# Patient Record
Sex: FEMALE | Race: WHITE | NOT HISPANIC OR LATINO | Employment: OTHER | URBAN - METROPOLITAN AREA
[De-identification: names, ages, dates, MRNs, and addresses within clinical notes are randomized per-mention and may not be internally consistent; named-entity substitution may affect disease eponyms.]

---

## 2018-06-09 ENCOUNTER — APPOINTMENT (EMERGENCY)
Dept: RADIOLOGY | Facility: HOSPITAL | Age: 58
End: 2018-06-09
Payer: COMMERCIAL

## 2018-06-09 ENCOUNTER — HOSPITAL ENCOUNTER (OUTPATIENT)
Facility: HOSPITAL | Age: 58
Setting detail: OBSERVATION
Discharge: HOME | End: 2018-06-11
Attending: EMERGENCY MEDICINE | Admitting: INTERNAL MEDICINE
Payer: COMMERCIAL

## 2018-06-09 DIAGNOSIS — K62.5 BRIGHT RED BLOOD PER RECTUM: ICD-10-CM

## 2018-06-09 DIAGNOSIS — R10.9 ABDOMINAL PAIN: Primary | ICD-10-CM

## 2018-06-09 DIAGNOSIS — K52.9 CHRONIC DIARRHEA: ICD-10-CM

## 2018-06-09 DIAGNOSIS — Z72.0 TOBACCO ABUSE: ICD-10-CM

## 2018-06-09 DIAGNOSIS — K92.2 GI BLEED: ICD-10-CM

## 2018-06-09 DIAGNOSIS — K76.9 LIVER LESION: ICD-10-CM

## 2018-06-09 LAB
ALBUMIN SERPL BCP-MCNC: 3.5 G/DL (ref 3.5–5)
ALP SERPL-CCNC: 112 U/L (ref 46–116)
ALT SERPL W P-5'-P-CCNC: 27 U/L (ref 12–78)
ANION GAP SERPL CALCULATED.3IONS-SCNC: 6 MMOL/L (ref 4–13)
AST SERPL W P-5'-P-CCNC: 12 U/L (ref 5–45)
BACTERIA UR QL AUTO: ABNORMAL /HPF
BASOPHILS # BLD AUTO: 0.04 THOUSANDS/ΜL (ref 0–0.1)
BASOPHILS NFR BLD AUTO: 1 % (ref 0–1)
BILIRUB SERPL-MCNC: 0.24 MG/DL (ref 0.2–1)
BILIRUB UR QL STRIP: NEGATIVE
BUN SERPL-MCNC: 9 MG/DL (ref 5–25)
CALCIUM SERPL-MCNC: 8.7 MG/DL (ref 8.3–10.1)
CHLORIDE SERPL-SCNC: 110 MMOL/L (ref 100–108)
CHOLEST SERPL-MCNC: 276 MG/DL (ref 50–200)
CLARITY UR: CLEAR
CO2 SERPL-SCNC: 24 MMOL/L (ref 21–32)
COLOR UR: YELLOW
COLOR, POC: NORMAL
CREAT SERPL-MCNC: 0.98 MG/DL (ref 0.6–1.3)
CRP SERPL QL: 6.6 MG/L
EOSINOPHIL # BLD AUTO: 0.25 THOUSAND/ΜL (ref 0–0.61)
EOSINOPHIL NFR BLD AUTO: 4 % (ref 0–6)
ERYTHROCYTE [DISTWIDTH] IN BLOOD BY AUTOMATED COUNT: 13.6 % (ref 11.6–15.1)
EST. AVERAGE GLUCOSE BLD GHB EST-MCNC: 120 MG/DL
GFR SERPL CREATININE-BSD FRML MDRD: 65 ML/MIN/1.73SQ M
GLUCOSE SERPL-MCNC: 98 MG/DL (ref 65–140)
GLUCOSE UR STRIP-MCNC: NEGATIVE MG/DL
HBA1C MFR BLD: 5.8 % (ref 4.2–6.3)
HCT VFR BLD AUTO: 39.2 % (ref 34.8–46.1)
HDLC SERPL-MCNC: 49 MG/DL (ref 40–60)
HGB BLD-MCNC: 13.2 G/DL (ref 11.5–15.4)
HGB UR QL STRIP.AUTO: ABNORMAL
HYALINE CASTS #/AREA URNS LPF: ABNORMAL /LPF
IGA SERPL-MCNC: 143 MG/DL (ref 70–400)
IMM GRANULOCYTES # BLD AUTO: 0.01 THOUSAND/UL (ref 0–0.2)
IMM GRANULOCYTES NFR BLD AUTO: 0 % (ref 0–2)
INR PPP: 1 (ref 0.86–1.17)
KETONES UR STRIP-MCNC: NEGATIVE MG/DL
LDLC SERPL CALC-MCNC: 202 MG/DL (ref 0–100)
LEUKOCYTE ESTERASE UR QL STRIP: NEGATIVE
LIPASE SERPL-CCNC: 83 U/L (ref 73–393)
LYMPHOCYTES # BLD AUTO: 3.04 THOUSANDS/ΜL (ref 0.6–4.47)
LYMPHOCYTES NFR BLD AUTO: 50 % (ref 14–44)
MCH RBC QN AUTO: 30.1 PG (ref 26.8–34.3)
MCHC RBC AUTO-ENTMCNC: 33.7 G/DL (ref 31.4–37.4)
MCV RBC AUTO: 90 FL (ref 82–98)
MONOCYTES # BLD AUTO: 0.63 THOUSAND/ΜL (ref 0.17–1.22)
MONOCYTES NFR BLD AUTO: 10 % (ref 4–12)
NEUTROPHILS # BLD AUTO: 2.11 THOUSANDS/ΜL (ref 1.85–7.62)
NEUTS SEG NFR BLD AUTO: 35 % (ref 43–75)
NITRITE UR QL STRIP: NEGATIVE
NON-SQ EPI CELLS URNS QL MICRO: ABNORMAL /HPF
NONHDLC SERPL-MCNC: 227 MG/DL
NRBC BLD AUTO-RTO: 0 /100 WBCS
PH UR STRIP.AUTO: 8.5 [PH] (ref 4.5–8)
PLATELET # BLD AUTO: 320 THOUSANDS/UL (ref 149–390)
PMV BLD AUTO: 8.9 FL (ref 8.9–12.7)
POTASSIUM SERPL-SCNC: 3.9 MMOL/L (ref 3.5–5.3)
PROT SERPL-MCNC: 7 G/DL (ref 6.4–8.2)
PROT UR STRIP-MCNC: ABNORMAL MG/DL
PROTHROMBIN TIME: 13.3 SECONDS (ref 11.8–14.2)
RBC # BLD AUTO: 4.38 MILLION/UL (ref 3.81–5.12)
RBC #/AREA URNS AUTO: ABNORMAL /HPF
SODIUM SERPL-SCNC: 140 MMOL/L (ref 136–145)
SP GR UR STRIP.AUTO: 1.01 (ref 1–1.03)
TRIGL SERPL-MCNC: 127 MG/DL
UROBILINOGEN UR QL STRIP.AUTO: 1 E.U./DL
WBC # BLD AUTO: 6.08 THOUSAND/UL (ref 4.31–10.16)
WBC #/AREA URNS AUTO: ABNORMAL /HPF

## 2018-06-09 PROCEDURE — 36415 COLL VENOUS BLD VENIPUNCTURE: CPT | Performed by: EMERGENCY MEDICINE

## 2018-06-09 PROCEDURE — 96374 THER/PROPH/DIAG INJ IV PUSH: CPT

## 2018-06-09 PROCEDURE — 96376 TX/PRO/DX INJ SAME DRUG ADON: CPT

## 2018-06-09 PROCEDURE — 99285 EMERGENCY DEPT VISIT HI MDM: CPT

## 2018-06-09 PROCEDURE — 74177 CT ABD & PELVIS W/CONTRAST: CPT

## 2018-06-09 PROCEDURE — 82784 ASSAY IGA/IGD/IGG/IGM EACH: CPT | Performed by: INTERNAL MEDICINE

## 2018-06-09 PROCEDURE — 86140 C-REACTIVE PROTEIN: CPT | Performed by: INTERNAL MEDICINE

## 2018-06-09 PROCEDURE — 96375 TX/PRO/DX INJ NEW DRUG ADDON: CPT

## 2018-06-09 PROCEDURE — 85610 PROTHROMBIN TIME: CPT | Performed by: INTERNAL MEDICINE

## 2018-06-09 PROCEDURE — 83690 ASSAY OF LIPASE: CPT | Performed by: EMERGENCY MEDICINE

## 2018-06-09 PROCEDURE — 85025 COMPLETE CBC W/AUTO DIFF WBC: CPT | Performed by: EMERGENCY MEDICINE

## 2018-06-09 PROCEDURE — 99220 PR INITIAL OBSERVATION CARE/DAY 70 MINUTES: CPT | Performed by: INTERNAL MEDICINE

## 2018-06-09 PROCEDURE — 80061 LIPID PANEL: CPT | Performed by: INTERNAL MEDICINE

## 2018-06-09 PROCEDURE — 81001 URINALYSIS AUTO W/SCOPE: CPT

## 2018-06-09 PROCEDURE — 80053 COMPREHEN METABOLIC PANEL: CPT | Performed by: EMERGENCY MEDICINE

## 2018-06-09 PROCEDURE — 83036 HEMOGLOBIN GLYCOSYLATED A1C: CPT | Performed by: INTERNAL MEDICINE

## 2018-06-09 RX ORDER — PROPRANOLOL HYDROCHLORIDE 20 MG/1
40 TABLET ORAL
Status: DISCONTINUED | OUTPATIENT
Start: 2018-06-09 | End: 2018-06-10

## 2018-06-09 RX ORDER — CYCLOBENZAPRINE HCL 10 MG
10 TABLET ORAL 3 TIMES DAILY PRN
COMMUNITY
End: 2018-06-11 | Stop reason: HOSPADM

## 2018-06-09 RX ORDER — HYDRALAZINE HYDROCHLORIDE 20 MG/ML
5 INJECTION INTRAMUSCULAR; INTRAVENOUS EVERY 4 HOURS PRN
Status: DISCONTINUED | OUTPATIENT
Start: 2018-06-09 | End: 2018-06-11 | Stop reason: HOSPADM

## 2018-06-09 RX ORDER — ONDANSETRON 2 MG/ML
4 INJECTION INTRAMUSCULAR; INTRAVENOUS ONCE
Status: COMPLETED | OUTPATIENT
Start: 2018-06-09 | End: 2018-06-09

## 2018-06-09 RX ORDER — ARIPIPRAZOLE 10 MG/1
10 TABLET ORAL DAILY
COMMUNITY
End: 2020-01-04 | Stop reason: ALTCHOICE

## 2018-06-09 RX ORDER — PROPRANOLOL HYDROCHLORIDE 20 MG/1
20 TABLET ORAL DAILY
Status: DISCONTINUED | OUTPATIENT
Start: 2018-06-10 | End: 2018-06-10

## 2018-06-09 RX ORDER — CARBAMAZEPINE 200 MG/1
200 TABLET ORAL 2 TIMES DAILY
COMMUNITY
End: 2020-01-04 | Stop reason: ALTCHOICE

## 2018-06-09 RX ORDER — TOPIRAMATE 100 MG/1
100 TABLET, FILM COATED ORAL 2 TIMES DAILY
COMMUNITY
End: 2020-01-04 | Stop reason: ALTCHOICE

## 2018-06-09 RX ORDER — MORPHINE SULFATE 4 MG/ML
4 INJECTION, SOLUTION INTRAMUSCULAR; INTRAVENOUS ONCE
Status: COMPLETED | OUTPATIENT
Start: 2018-06-09 | End: 2018-06-09

## 2018-06-09 RX ORDER — LANOLIN ALCOHOL/MO/W.PET/CERES
3 CREAM (GRAM) TOPICAL
Status: DISCONTINUED | OUTPATIENT
Start: 2018-06-09 | End: 2018-06-11 | Stop reason: HOSPADM

## 2018-06-09 RX ORDER — ESCITALOPRAM OXALATE 5 MG/1
5 TABLET ORAL DAILY
COMMUNITY
End: 2020-01-04 | Stop reason: ALTCHOICE

## 2018-06-09 RX ORDER — PROPRANOLOL HYDROCHLORIDE 40 MG/1
40 TABLET ORAL
COMMUNITY
End: 2020-01-04 | Stop reason: ALTCHOICE

## 2018-06-09 RX ORDER — IBUPROFEN 600 MG/1
600 TABLET ORAL 4 TIMES DAILY PRN
COMMUNITY
End: 2018-06-11 | Stop reason: HOSPADM

## 2018-06-09 RX ORDER — MORPHINE SULFATE 10 MG/ML
6 INJECTION, SOLUTION INTRAMUSCULAR; INTRAVENOUS ONCE
Status: DISCONTINUED | OUTPATIENT
Start: 2018-06-09 | End: 2018-06-09

## 2018-06-09 RX ORDER — ESCITALOPRAM OXALATE 10 MG/1
5 TABLET ORAL
COMMUNITY
End: 2020-01-04 | Stop reason: ALTCHOICE

## 2018-06-09 RX ORDER — PROPRANOLOL HYDROCHLORIDE 20 MG/1
20 TABLET ORAL DAILY
COMMUNITY
End: 2020-01-04 | Stop reason: ALTCHOICE

## 2018-06-09 RX ORDER — GABAPENTIN 600 MG/1
600 TABLET ORAL 3 TIMES DAILY
COMMUNITY
End: 2020-01-04 | Stop reason: ALTCHOICE

## 2018-06-09 RX ORDER — LANOLIN ALCOHOL/MO/W.PET/CERES
3 CREAM (GRAM) TOPICAL
COMMUNITY
End: 2020-01-04 | Stop reason: ALTCHOICE

## 2018-06-09 RX ORDER — SUMATRIPTAN 100 MG/1
100 TABLET, FILM COATED ORAL ONCE AS NEEDED
COMMUNITY
End: 2020-01-04 | Stop reason: ALTCHOICE

## 2018-06-09 RX ORDER — BUSPIRONE HYDROCHLORIDE 30 MG/1
30 TABLET ORAL 2 TIMES DAILY
COMMUNITY
End: 2020-01-04 | Stop reason: ALTCHOICE

## 2018-06-09 RX ORDER — BUSPIRONE HYDROCHLORIDE 10 MG/1
30 TABLET ORAL 2 TIMES DAILY
Status: DISCONTINUED | OUTPATIENT
Start: 2018-06-09 | End: 2018-06-11 | Stop reason: HOSPADM

## 2018-06-09 RX ORDER — GABAPENTIN 300 MG/1
600 CAPSULE ORAL 3 TIMES DAILY
Status: DISCONTINUED | OUTPATIENT
Start: 2018-06-09 | End: 2018-06-11 | Stop reason: HOSPADM

## 2018-06-09 RX ORDER — ESCITALOPRAM OXALATE 10 MG/1
5 TABLET ORAL DAILY
Status: DISCONTINUED | OUTPATIENT
Start: 2018-06-10 | End: 2018-06-11 | Stop reason: HOSPADM

## 2018-06-09 RX ORDER — HYDROXYZINE 50 MG/1
50 TABLET, FILM COATED ORAL 4 TIMES DAILY PRN
COMMUNITY
End: 2020-01-04 | Stop reason: ALTCHOICE

## 2018-06-09 RX ORDER — CYCLOBENZAPRINE HCL 10 MG
10 TABLET ORAL 3 TIMES DAILY PRN
Status: DISCONTINUED | OUTPATIENT
Start: 2018-06-09 | End: 2018-06-11

## 2018-06-09 RX ORDER — KETOROLAC TROMETHAMINE 30 MG/ML
15 INJECTION, SOLUTION INTRAMUSCULAR; INTRAVENOUS EVERY 6 HOURS PRN
Status: DISCONTINUED | OUTPATIENT
Start: 2018-06-09 | End: 2018-06-11 | Stop reason: HOSPADM

## 2018-06-09 RX ORDER — NICOTINE 21 MG/24HR
1 PATCH, TRANSDERMAL 24 HOURS TRANSDERMAL DAILY
Status: DISCONTINUED | OUTPATIENT
Start: 2018-06-10 | End: 2018-06-09

## 2018-06-09 RX ORDER — TOPIRAMATE 100 MG/1
100 TABLET, FILM COATED ORAL 2 TIMES DAILY
Status: DISCONTINUED | OUTPATIENT
Start: 2018-06-09 | End: 2018-06-11 | Stop reason: HOSPADM

## 2018-06-09 RX ORDER — NICOTINE 21 MG/24HR
1 PATCH, TRANSDERMAL 24 HOURS TRANSDERMAL DAILY
Status: DISCONTINUED | OUTPATIENT
Start: 2018-06-09 | End: 2018-06-11 | Stop reason: HOSPADM

## 2018-06-09 RX ORDER — ESCITALOPRAM OXALATE 10 MG/1
5 TABLET ORAL
Status: DISCONTINUED | OUTPATIENT
Start: 2018-06-09 | End: 2018-06-11 | Stop reason: HOSPADM

## 2018-06-09 RX ORDER — ARIPIPRAZOLE 10 MG/1
10 TABLET ORAL DAILY
Status: DISCONTINUED | OUTPATIENT
Start: 2018-06-10 | End: 2018-06-11 | Stop reason: HOSPADM

## 2018-06-09 RX ORDER — CARBAMAZEPINE 200 MG/1
200 TABLET ORAL 2 TIMES DAILY
Status: DISCONTINUED | OUTPATIENT
Start: 2018-06-09 | End: 2018-06-11 | Stop reason: HOSPADM

## 2018-06-09 RX ORDER — MORPHINE SULFATE 4 MG/ML
6 INJECTION, SOLUTION INTRAMUSCULAR; INTRAVENOUS ONCE
Status: COMPLETED | OUTPATIENT
Start: 2018-06-09 | End: 2018-06-09

## 2018-06-09 RX ORDER — SODIUM CHLORIDE 9 MG/ML
75 INJECTION, SOLUTION INTRAVENOUS CONTINUOUS
Status: DISCONTINUED | OUTPATIENT
Start: 2018-06-09 | End: 2018-06-10

## 2018-06-09 RX ORDER — ACETAMINOPHEN 325 MG/1
650 TABLET ORAL EVERY 6 HOURS PRN
Status: DISCONTINUED | OUTPATIENT
Start: 2018-06-09 | End: 2018-06-11 | Stop reason: HOSPADM

## 2018-06-09 RX ADMIN — IOHEXOL 100 ML: 350 INJECTION, SOLUTION INTRAVENOUS at 18:26

## 2018-06-09 RX ADMIN — ONDANSETRON 4 MG: 2 INJECTION, SOLUTION INTRAMUSCULAR; INTRAVENOUS at 18:05

## 2018-06-09 RX ADMIN — KETOROLAC TROMETHAMINE 15 MG: 30 INJECTION, SOLUTION INTRAMUSCULAR at 21:27

## 2018-06-09 RX ADMIN — TOPIRAMATE 100 MG: 100 TABLET, FILM COATED ORAL at 22:57

## 2018-06-09 RX ADMIN — MORPHINE SULFATE 4 MG: 4 INJECTION INTRAVENOUS at 19:08

## 2018-06-09 RX ADMIN — MELATONIN TAB 3 MG 3 MG: 3 TAB at 22:23

## 2018-06-09 RX ADMIN — NICOTINE 1 PATCH: 21 PATCH, EXTENDED RELEASE TRANSDERMAL at 22:41

## 2018-06-09 RX ADMIN — ESCITALOPRAM OXALATE 5 MG: 10 TABLET ORAL at 22:23

## 2018-06-09 RX ADMIN — MORPHINE SULFATE 6 MG: 4 INJECTION INTRAVENOUS at 18:11

## 2018-06-09 RX ADMIN — BUSPIRONE HYDROCHLORIDE 30 MG: 10 TABLET ORAL at 22:21

## 2018-06-09 RX ADMIN — CARBAMAZEPINE 200 MG: 200 TABLET ORAL at 22:22

## 2018-06-09 RX ADMIN — GABAPENTIN 600 MG: 300 CAPSULE ORAL at 22:23

## 2018-06-09 RX ADMIN — SODIUM CHLORIDE 75 ML/HR: 0.9 INJECTION, SOLUTION INTRAVENOUS at 21:59

## 2018-06-09 NOTE — ED PROVIDER NOTES
History  Chief Complaint   Patient presents with    Abdominal Pain     Pt c/o chronic abdominal pain for the last 4-5 months after eating, on and off, however, for the last 3 days pain has been constant and has noticed bright red blood in stools over the last 2 days  Pt concerned, here for evaluation     56-year-old female presenting to the emergency department for evaluation of abdominal pain and bleeding  The patient has had about a month's worth of diarrhea associated with mild diffuse cramping abdominal pain which had been relieved with bowel movements  The diarrhea she describes as loose stool, "anything I eat goes through me"  initially it had not been associated with any blood in the stool  She describes some diffuse cramping abdominal pain which was mild intermittent and relieved after bowel movements  Over the past month this pain has become slightly more severe  Particularly over the past week and has become more severe and also more constant  In the past 3 days she started to notice some blood in the stool  This started as small amount of bright red blood with wiping and has progressed to today 3 large bloody bowel movements  She is not on any anticoagulant medications  She has a past medical history significant for gyn cancer for which she had a hysterectomy and BSO in her 35s  She had colonoscopy at that time which showed only polyps  She also had a appendectomy around that time because there was apparently cancer found her appendix  She has had no further oncologic complications that she knows of  She denies nausea or vomiting  She denies fevers or chills  She has some lightheadedness but denies chest pain palpitations or shortness of breath  Prior to Admission Medications   Prescriptions Last Dose Informant Patient Reported? Taking?    ARIPiprazole (ABILIFY) 10 mg tablet   Yes Yes   Sig: Take 10 mg by mouth daily   Aspirin-Acetaminophen-Caffeine (EXCEDRIN PO)   Yes Yes Sig: Take 2 tablets by mouth 4 (four) times a day as needed Pain reliever plus   SUMAtriptan (IMITREX) 100 mg tablet   Yes Yes   Sig: Take 100 mg by mouth once as needed for migraine   busPIRone (BUSPAR) 30 MG tablet   Yes Yes   Sig: Take 30 mg by mouth 2 (two) times a day   carBAMazepine (TEGretol) 200 mg tablet   Yes Yes   Sig: Take 200 mg by mouth 2 (two) times a day   cyclobenzaprine (FLEXERIL) 10 mg tablet   Yes Yes   Sig: Take 10 mg by mouth 3 (three) times a day as needed for muscle spasms   escitalopram (LEXAPRO) 10 mg tablet   Yes Yes   Sig: Take 5 mg by mouth daily at bedtime     escitalopram (LEXAPRO) 5 mg tablet   Yes Yes   Sig: Take 5 mg by mouth daily AM    gabapentin (NEURONTIN) 600 MG tablet   Yes Yes   Sig: Take 600 mg by mouth 3 (three) times a day   hydrOXYzine HCL (ATARAX) 50 mg tablet   Yes Yes   Sig: Take 50 mg by mouth 4 (four) times a day as needed for itching   ibuprofen (MOTRIN) 600 mg tablet   Yes Yes   Sig: Take 600 mg by mouth 4 (four) times a day as needed for mild pain   melatonin 3 mg   Yes Yes   Sig: Take 3 mg by mouth daily at bedtime   propranolol (INDERAL) 20 mg tablet   Yes Yes   Sig: Take 20 mg by mouth daily AM   propranolol (INDERAL) 40 mg tablet   Yes Yes   Sig: Take 40 mg by mouth daily at bedtime   topiramate (TOPAMAX) 100 mg tablet   Yes Yes   Sig: Take 100 mg by mouth 2 (two) times a day      Facility-Administered Medications: None       Past Medical History:   Diagnosis Date    Bipolar 1 disorder (HCC)     Cancer ([facility] [address] )     ovarian    Depression        Past Surgical History:   Procedure Laterality Date    HYSTERECTOMY      JOINT REPLACEMENT Bilateral     knee       Family History   Problem Relation Age of Onset    No Known Problems Mother     No Known Problems Father      I have reviewed and agree with the history as documented      Social History   Substance Use Topics    Smoking status: Current Every Day Smoker     Packs/day: 1 50     Years: 44 00    Smokeless tobacco: Never Used    Alcohol use No        Review of Systems   Constitutional: Negative for appetite change, chills, fatigue and fever  HENT: Negative for sneezing and sore throat  Eyes: Negative for visual disturbance  Respiratory: Negative for cough, choking, chest tightness, shortness of breath and wheezing  Cardiovascular: Negative for chest pain and palpitations  Gastrointestinal: Positive for abdominal pain, blood in stool and diarrhea  Negative for constipation, nausea and vomiting  Genitourinary: Negative for difficulty urinating and dysuria  Neurological: Negative for dizziness, weakness, light-headedness, numbness and headaches  All other systems reviewed and are negative  Physical Exam  ED Triage Vitals [06/09/18 1732]   Temperature Pulse Respirations Blood Pressure SpO2   97 6 °F (36 4 °C) 73 18 139/63 98 %      Temp Source Heart Rate Source Patient Position - Orthostatic VS BP Location FiO2 (%)   Oral Monitor Lying Left arm --      Pain Score       8           Orthostatic Vital Signs  Vitals:    06/10/18 0104 06/10/18 0122 06/10/18 0745 06/10/18 1500   BP: (!) 87/49 100/60 110/57 110/70   Pulse: 55  57 56   Patient Position - Orthostatic VS:   Lying Sitting       Physical Exam   Constitutional: She is oriented to person, place, and time  She appears well-developed and well-nourished  No distress  HENT:   Head: Normocephalic and atraumatic  Mouth/Throat: Oropharynx is clear and moist    Eyes: EOM are normal  Pupils are equal, round, and reactive to light  Neck: No JVD present  No tracheal deviation present  Cardiovascular: Normal rate, regular rhythm, normal heart sounds and intact distal pulses  Exam reveals no gallop and no friction rub  No murmur heard  Pulmonary/Chest: Effort normal and breath sounds normal  No respiratory distress  She has no wheezes  She has no rales  Abdominal: Soft  Bowel sounds are normal  She exhibits no distension   There is tenderness in the right lower quadrant and left lower quadrant  There is no rebound and no guarding  Neurological: She is alert and oriented to person, place, and time  No cranial nerve deficit  She exhibits normal muscle tone  Skin: Skin is warm and dry  She is not diaphoretic  No pallor  Psychiatric: She has a normal mood and affect  Her behavior is normal    Nursing note and vitals reviewed        ED Medications  Medications   carBAMazepine (TEGretol) tablet 200 mg (200 mg Oral Given 6/10/18 1737)   busPIRone (BUSPAR) tablet 30 mg (30 mg Oral Given 6/10/18 1737)   ARIPiprazole (ABILIFY) tablet 10 mg (10 mg Oral Given 6/10/18 1037)   escitalopram (LEXAPRO) tablet 5 mg (5 mg Oral Given 6/9/18 2223)   escitalopram (LEXAPRO) tablet 5 mg (5 mg Oral Given 6/10/18 1035)   gabapentin (NEURONTIN) capsule 600 mg (600 mg Oral Given 6/10/18 1632)   topiramate (TOPAMAX) tablet 100 mg (100 mg Oral Given 6/10/18 1738)   melatonin tablet 3 mg (3 mg Oral Given 6/9/18 2223)   cyclobenzaprine (FLEXERIL) tablet 10 mg (not administered)   acetaminophen (TYLENOL) tablet 650 mg (not administered)   ketorolac (TORADOL) injection 15 mg (15 mg Intravenous Given 6/10/18 1632)   nicotine (NICODERM CQ) 21 mg/24 hr TD 24 hr patch 1 patch (1 patch Transdermal Medication Applied 6/10/18 1035)   hydrALAZINE (APRESOLINE) injection 5 mg (not administered)   sodium chloride 0 9 % infusion (75 mL/hr Intravenous New Bag 6/10/18 1600)   dicyclomine (BENTYL) capsule 20 mg (20 mg Oral Given 6/10/18 1632)   ondansetron (ZOFRAN) injection 4 mg (4 mg Intravenous Given 6/9/18 1805)   morphine (PF) 4 mg/mL injection 6 mg (6 mg Intravenous Given 6/9/18 1811)   iohexol (OMNIPAQUE) 350 MG/ML injection (MULTI-DOSE) 100 mL (100 mL Intravenous Given 6/9/18 1826)   morphine (PF) 4 mg/mL injection 4 mg (4 mg Intravenous Given 6/9/18 1908)   HYDROmorphone (DILAUDID) injection 1 mg (1 mg Intravenous Given 6/10/18 0150)   polyethylene glycol (GOLYTELY) bowel prep 4,000 mL (4,000 mL Oral Given 6/10/18 1706)   bisacodyl (DULCOLAX) EC tablet 10 mg (10 mg Oral Given 6/10/18 1320)       Diagnostic Studies  Results Reviewed     Procedure Component Value Units Date/Time    Tissue Tiara Cao [[id number]] Collected:  06/10/18 0535    Lab Status:   In process Specimen:  Blood from Hand, Right Updated:  06/10/18 0632    C-reactive protein [[id number]]  (Abnormal) Collected:  06/09/18 1747    Lab Status:  Final result Specimen:  Blood from Arm, Left Updated:  06/09/18 2349     CRP 6 6 (H) mg/L     IgA [[id number]]  (Normal) Collected:  06/09/18 1747    Lab Status:  Final result Specimen:  Blood from Arm, Left Updated:  06/09/18 2349      0 mg/dL     Calprotectin,Fecal [[id number]]     Lab Status:  No result Specimen:  Stool     Stool Enteric Bacterial Panel by PCR [[id number]]     Lab Status:  No result Specimen:  Stool     Urine Microscopic [[id number]]  (Abnormal) Collected:  06/09/18 1852    Lab Status:  Final result Specimen:  Urine from Urine, Clean Catch Updated:  06/09/18 1904     RBC, UA 4-10 (A) /hpf      WBC, UA 2-4 (A) /hpf      Epithelial Cells Occasional /hpf      Bacteria, UA Occasional /hpf      Hyaline Casts, UA None Seen /lpf     POCT urinalysis dipstick [[id number]]  (Normal) Resulted:  06/09/18 1845    Lab Status:  Final result Updated:  06/09/18 1846     Color, UA -    ED Urine Macroscopic [[id number]]  (Abnormal) Collected:  06/09/18 1852    Lab Status:  Final result Specimen:  Urine Updated:  06/09/18 1845     Color, UA Yellow     Clarity, UA Clear     pH, UA 8 5 (H)     Leukocytes, UA Negative     Nitrite, UA Negative     Protein, UA Trace (A) mg/dl      Glucose, UA Negative mg/dl      Ketones, UA Negative mg/dl      Urobilinogen, UA 1 0 E U /dl      Bilirubin, UA Negative     Blood, UA Moderate (A)     Specific [place], UA 1 015    Narrative:       CLINITEK RESULT    Comprehensive metabolic panel [[id number]]  (Abnormal) Collected:  06/09/18 1747    Lab Status:  Final result Specimen:  Blood from Arm, Left Updated:  06/09/18 1814     Sodium 140 mmol/L      Potassium 3 9 mmol/L      Chloride 110 (H) mmol/L      CO2 24 mmol/L      Anion Gap 6 mmol/L      BUN 9 mg/dL      Creatinine 0 98 mg/dL      Glucose 98 mg/dL      Calcium 8 7 mg/dL      AST 12 U/L      ALT 27 U/L      Alkaline Phosphatase 112 U/L      Total Protein 7 0 g/dL      Albumin 3 5 g/dL      Total Bilirubin 0 24 mg/dL      eGFR 65 ml/min/1 73sq m     Narrative:         National Kidney Disease Education Program recommendations are as follows:  GFR calculation is accurate only with a steady state creatinine  Chronic Kidney disease less than 60 ml/min/1 73 sq  meters  Kidney failure less than 15 ml/min/1 73 sq  meters  Lipase [[id number]]  (Normal) Collected:  06/09/18 1747    Lab Status:  Final result Specimen:  Blood from Arm, Left Updated:  06/09/18 1814     Lipase 83 u/L     CBC and differential [[id number]]  (Abnormal) Collected:  06/09/18 1747    Lab Status:  Final result Specimen:  Blood from Arm, Left Updated:  06/09/18 1757     WBC 6 08 Thousand/uL      RBC 4 38 Million/uL      Hemoglobin 13 2 g/dL      Hematocrit 39 2 %      MCV 90 fL      MCH 30 1 pg      MCHC 33 7 g/dL      RDW 13 6 %      MPV 8 9 fL      Platelets 980 Thousands/uL      nRBC 0 /100 WBCs      Neutrophils Relative 35 (L) %      Immat GRANS % 0 %      Lymphocytes Relative 50 (H) %      Monocytes Relative 10 %      Eosinophils Relative 4 %      Basophils Relative 1 %      Neutrophils Absolute 2 11 Thousands/µL      Immature Grans Absolute 0 01 Thousand/uL      Lymphocytes Absolute 3 04 Thousands/µL      Monocytes Absolute 0 63 Thousand/µL      Eosinophils Absolute 0 25 Thousand/µL      Basophils Absolute 0 04 Thousands/µL                  CT abdomen pelvis with contrast   Final Result by Sophie Waite DO (06/09 1859)      Interval development of several hypodensities within the liver, most of which are less than 1 cm in size    There is a larger hypodensity within the inferior aspect of the liver below the gallbladder fossa measuring 2 1 cm in diameter  These may    represent hepatic cysts  Follow-up hepatic ultrasound recommended to confirm simple cysts  This can be performed on an outpatient basis  Passive atelectatic change within the lung bases posteriorly  The study was marked in EPIC for significant notification  Workstation performed: [id number]               Procedures  Procedures      Phone Consults  ED Phone Contact    ED Course                               MDM  Number of Diagnoses or Management Options  Abdominal pain:   Bright red blood per rectum:   Chronic diarrhea:   GI bleed:   Liver lesion:   Diagnosis management comments: 56-year-old female with history abdominal pelvic malignancy and progressively worsening diarrhea, blood in stool, cramping abdominal pain  Concern for colitis versus possible malignancy  Will check labs CT abdomen pelvis give pain medicines  CritCare Time    Disposition  Final diagnoses:   Abdominal pain   GI bleed   Liver lesion   Bright red blood per rectum   Chronic diarrhea     Time reflects when diagnosis was documented in both MDM as applicable and the Disposition within this note     Time User Action Codes Description Comment    6/9/2018  7:58 PM Yg Ontiveros [R10 9] Abdominal pain     6/9/2018  7:58 PM Yg Ontiveros [K92 2] GI bleed     6/9/2018  7:59 PM Yg Ontiveros [K76 9] Liver lesion     6/10/2018  6:39 AM Ladene Lung Add [K62 5] Bright red blood per rectum     6/10/2018  6:39 AM Ladene Lung Add [K52 9] Chronic diarrhea       ED Disposition     ED Disposition Condition Comment    Admit  Case was discussed with SOD and the patient's admission status was agreed to be Admission Status: inpatient status to the service of Dr Rosette Torres           Follow-up Information    None         Current Discharge Medication List      CONTINUE these medications which have NOT CHANGED    Details   ARIPiprazole (ABILIFY) 10 mg tablet Take 10 mg by mouth daily      Aspirin-Acetaminophen-Caffeine (EXCEDRIN PO) Take 2 tablets by mouth 4 (four) times a day as needed Pain reliever plus      busPIRone (BUSPAR) 30 MG tablet Take 30 mg by mouth 2 (two) times a day      carBAMazepine (TEGretol) 200 mg tablet Take 200 mg by mouth 2 (two) times a day      cyclobenzaprine (FLEXERIL) 10 mg tablet Take 10 mg by mouth 3 (three) times a day as needed for muscle spasms      !! escitalopram (LEXAPRO) 10 mg tablet Take 5 mg by mouth daily at bedtime        !! escitalopram (LEXAPRO) 5 mg tablet Take 5 mg by mouth daily AM       gabapentin (NEURONTIN) 600 MG tablet Take 600 mg by mouth 3 (three) times a day      hydrOXYzine HCL (ATARAX) 50 mg tablet Take 50 mg by mouth 4 (four) times a day as needed for itching      ibuprofen (MOTRIN) 600 mg tablet Take 600 mg by mouth 4 (four) times a day as needed for mild pain      melatonin 3 mg Take 3 mg by mouth daily at bedtime      !! propranolol (INDERAL) 20 mg tablet Take 20 mg by mouth daily AM      !! propranolol (INDERAL) 40 mg tablet Take 40 mg by mouth daily at bedtime      SUMAtriptan (IMITREX) 100 mg tablet Take 100 mg by mouth once as needed for migraine      topiramate (TOPAMAX) 100 mg tablet Take 100 mg by mouth 2 (two) times a day       !! - Potential duplicate medications found  Please discuss with provider  No discharge procedures on file  ED Provider  Attending physically available and evaluated Shelia Tsering HAMEED managed the patient along with the ED Attending      Electronically Signed by         Darius Carrasco MD  06/10/18 8219

## 2018-06-09 NOTE — ED ATTENDING ATTESTATION
Claudia Kirk MD, saw and evaluated the patient  I have discussed the patient with the resident/non-physician practitioner and agree with the resident's/non-physician practitioner's findings, Plan of Care, and MDM as documented in the resident's/non-physician practitioner's note, except where noted  All available labs and Radiology studies were reviewed  At this point I agree with the current assessment done in the Emergency Department  I have conducted an independent evaluation of this patient a history and physical is as follows: Pt presents with abdominal pain Pt started 1 month ago with diarrhea and abd pain Pt has gotten worse in the past 3 days  Today had bright red BM as well as a few episodes over the past 3 days  Now pain is constant  histoyr of appendiceal ca and guerline bso in past PE: heart reg lungs clear abd soft tender bilat lower quads   MDM: will check labs and ct abd  Critical Care Time  CritCare Time    Procedures

## 2018-06-10 LAB
ANION GAP SERPL CALCULATED.3IONS-SCNC: 5 MMOL/L (ref 4–13)
BASOPHILS # BLD AUTO: 0.03 THOUSANDS/ΜL (ref 0–0.1)
BASOPHILS NFR BLD AUTO: 1 % (ref 0–1)
BUN SERPL-MCNC: 12 MG/DL (ref 5–25)
CALCIUM SERPL-MCNC: 8.1 MG/DL (ref 8.3–10.1)
CHLORIDE SERPL-SCNC: 111 MMOL/L (ref 100–108)
CO2 SERPL-SCNC: 23 MMOL/L (ref 21–32)
CREAT SERPL-MCNC: 0.71 MG/DL (ref 0.6–1.3)
EOSINOPHIL # BLD AUTO: 0.17 THOUSAND/ΜL (ref 0–0.61)
EOSINOPHIL NFR BLD AUTO: 3 % (ref 0–6)
ERYTHROCYTE [DISTWIDTH] IN BLOOD BY AUTOMATED COUNT: 13.7 % (ref 11.6–15.1)
GFR SERPL CREATININE-BSD FRML MDRD: 96 ML/MIN/1.73SQ M
GLUCOSE SERPL-MCNC: 88 MG/DL (ref 65–140)
HCT VFR BLD AUTO: 35.7 % (ref 34.8–46.1)
HGB BLD-MCNC: 11.5 G/DL (ref 11.5–15.4)
IMM GRANULOCYTES # BLD AUTO: 0.02 THOUSAND/UL (ref 0–0.2)
IMM GRANULOCYTES NFR BLD AUTO: 0 % (ref 0–2)
LYMPHOCYTES # BLD AUTO: 2.5 THOUSANDS/ΜL (ref 0.6–4.47)
LYMPHOCYTES NFR BLD AUTO: 42 % (ref 14–44)
MCH RBC QN AUTO: 29.3 PG (ref 26.8–34.3)
MCHC RBC AUTO-ENTMCNC: 32.2 G/DL (ref 31.4–37.4)
MCV RBC AUTO: 91 FL (ref 82–98)
MONOCYTES # BLD AUTO: 0.55 THOUSAND/ΜL (ref 0.17–1.22)
MONOCYTES NFR BLD AUTO: 9 % (ref 4–12)
NEUTROPHILS # BLD AUTO: 2.67 THOUSANDS/ΜL (ref 1.85–7.62)
NEUTS SEG NFR BLD AUTO: 45 % (ref 43–75)
NRBC BLD AUTO-RTO: 0 /100 WBCS
PLATELET # BLD AUTO: 247 THOUSANDS/UL (ref 149–390)
PMV BLD AUTO: 9.2 FL (ref 8.9–12.7)
POTASSIUM SERPL-SCNC: 3.8 MMOL/L (ref 3.5–5.3)
RBC # BLD AUTO: 3.92 MILLION/UL (ref 3.81–5.12)
SODIUM SERPL-SCNC: 139 MMOL/L (ref 136–145)
WBC # BLD AUTO: 5.94 THOUSAND/UL (ref 4.31–10.16)

## 2018-06-10 PROCEDURE — 83516 IMMUNOASSAY NONANTIBODY: CPT | Performed by: INTERNAL MEDICINE

## 2018-06-10 PROCEDURE — 80048 BASIC METABOLIC PNL TOTAL CA: CPT | Performed by: INTERNAL MEDICINE

## 2018-06-10 PROCEDURE — 99225 PR SBSQ OBSERVATION CARE/DAY 25 MINUTES: CPT | Performed by: INTERNAL MEDICINE

## 2018-06-10 PROCEDURE — 87493 C DIFF AMPLIFIED PROBE: CPT | Performed by: INTERNAL MEDICINE

## 2018-06-10 PROCEDURE — 84302 ASSAY OF SWEAT SODIUM: CPT | Performed by: INTERNAL MEDICINE

## 2018-06-10 PROCEDURE — 83993 ASSAY FOR CALPROTECTIN FECAL: CPT | Performed by: INTERNAL MEDICINE

## 2018-06-10 PROCEDURE — 85025 COMPLETE CBC W/AUTO DIFF WBC: CPT | Performed by: INTERNAL MEDICINE

## 2018-06-10 RX ORDER — DICYCLOMINE HYDROCHLORIDE 10 MG/1
20 CAPSULE ORAL
Status: DISCONTINUED | OUTPATIENT
Start: 2018-06-10 | End: 2018-06-11 | Stop reason: HOSPADM

## 2018-06-10 RX ORDER — SODIUM CHLORIDE 9 MG/ML
75 INJECTION, SOLUTION INTRAVENOUS CONTINUOUS
Status: DISCONTINUED | OUTPATIENT
Start: 2018-06-10 | End: 2018-06-11 | Stop reason: HOSPADM

## 2018-06-10 RX ADMIN — BUSPIRONE HYDROCHLORIDE 30 MG: 10 TABLET ORAL at 10:35

## 2018-06-10 RX ADMIN — KETOROLAC TROMETHAMINE 15 MG: 30 INJECTION, SOLUTION INTRAMUSCULAR at 10:37

## 2018-06-10 RX ADMIN — SODIUM CHLORIDE 75 ML/HR: 0.9 INJECTION, SOLUTION INTRAVENOUS at 16:00

## 2018-06-10 RX ADMIN — GABAPENTIN 600 MG: 300 CAPSULE ORAL at 10:34

## 2018-06-10 RX ADMIN — BISACODYL 10 MG: 5 TABLET, COATED ORAL at 13:20

## 2018-06-10 RX ADMIN — DICYCLOMINE HYDROCHLORIDE 20 MG: 10 CAPSULE ORAL at 16:32

## 2018-06-10 RX ADMIN — ESCITALOPRAM OXALATE 5 MG: 10 TABLET, FILM COATED ORAL at 10:35

## 2018-06-10 RX ADMIN — KETOROLAC TROMETHAMINE 15 MG: 30 INJECTION, SOLUTION INTRAMUSCULAR at 16:32

## 2018-06-10 RX ADMIN — GABAPENTIN 600 MG: 300 CAPSULE ORAL at 16:32

## 2018-06-10 RX ADMIN — HYDROMORPHONE HYDROCHLORIDE 1 MG: 1 INJECTION, SOLUTION INTRAMUSCULAR; INTRAVENOUS; SUBCUTANEOUS at 01:50

## 2018-06-10 RX ADMIN — TOPIRAMATE 100 MG: 100 TABLET, FILM COATED ORAL at 10:37

## 2018-06-10 RX ADMIN — CARBAMAZEPINE 200 MG: 200 TABLET ORAL at 10:34

## 2018-06-10 RX ADMIN — TOPIRAMATE 100 MG: 100 TABLET, FILM COATED ORAL at 17:38

## 2018-06-10 RX ADMIN — BUSPIRONE HYDROCHLORIDE 30 MG: 10 TABLET ORAL at 17:37

## 2018-06-10 RX ADMIN — GABAPENTIN 600 MG: 300 CAPSULE ORAL at 21:20

## 2018-06-10 RX ADMIN — CARBAMAZEPINE 200 MG: 200 TABLET ORAL at 17:37

## 2018-06-10 RX ADMIN — SODIUM CHLORIDE 125 ML/HR: 0.9 INJECTION, SOLUTION INTRAVENOUS at 01:51

## 2018-06-10 RX ADMIN — DICYCLOMINE HYDROCHLORIDE 20 MG: 10 CAPSULE ORAL at 21:20

## 2018-06-10 RX ADMIN — KETOROLAC TROMETHAMINE 15 MG: 30 INJECTION, SOLUTION INTRAMUSCULAR at 23:34

## 2018-06-10 RX ADMIN — ESCITALOPRAM OXALATE 5 MG: 10 TABLET ORAL at 21:20

## 2018-06-10 RX ADMIN — ACETAMINOPHEN 650 MG: 325 TABLET ORAL at 21:20

## 2018-06-10 RX ADMIN — ARIPIPRAZOLE 10 MG: 10 TABLET ORAL at 10:37

## 2018-06-10 RX ADMIN — KETOROLAC TROMETHAMINE 15 MG: 30 INJECTION, SOLUTION INTRAMUSCULAR at 04:49

## 2018-06-10 RX ADMIN — NICOTINE 1 PATCH: 21 PATCH, EXTENDED RELEASE TRANSDERMAL at 10:35

## 2018-06-10 RX ADMIN — POLYETHYLENE GLYCOL 3350, SODIUM SULFATE ANHYDROUS, SODIUM BICARBONATE, SODIUM CHLORIDE, POTASSIUM CHLORIDE 4000 ML: 236; 22.74; 6.74; 5.86; 2.97 POWDER, FOR SOLUTION ORAL at 17:06

## 2018-06-10 RX ADMIN — DICYCLOMINE HYDROCHLORIDE 20 MG: 10 CAPSULE ORAL at 10:35

## 2018-06-10 RX ADMIN — MELATONIN TAB 3 MG 3 MG: 3 TAB at 21:20

## 2018-06-10 NOTE — PROGRESS NOTES
[facility] Admission Note   Unit/Bed # @DBLINK ([facility],[address])@ Encounter: [id number]  SOD Team A          Татьяна Morales 56 y o  female [id number]       Patient seen and examined  Reviewed H&P per Dr Eve Mckee  Agree with the assessment and plan except NA  Assessment/Plan: Principal Problem:    Bright red blood per rectum  Active Problems:    Chronic diarrhea    Tobacco abuse     Briefly, this is a 72-year-old female with history of endometrial cancer, appendiceal cancer and nicotine dependence who presents with complaint of ongoing diarrhea for the past 2-3 months now with abdominal pain and development of bright red blood per rectum the last 3 days  She had 3 large bloody bowel movements earlier today  Hemoglobin is stable at 13 2  CT scan of the abdomen revealed interval development of several hypodensities within the liver most of which are less than 1 cm in diameter 1 larger in the inferior aspect deliver measuring 2 1 cm  Patient was admitted for observation and possible colonoscopy to rule out colon cancer in the setting of change in bowel movements and rectal bleeding  Ultrasound of the right upper quadrant for further evaluation of the hepatic lesions is pending  For further details of assessment and plans please see Dr Alex Groves full H&P          Disposition:  OBSERVATION    Expected LOS: <2 28 [facility], DO

## 2018-06-10 NOTE — PROGRESS NOTES
IM Residency Progress Note   Unit/Bed#: [facility] 628-01 Encounter: [id number]  SOD Team A      Татьяна Morales 56 y o  female [id number]    Hospital Stay Days: 0      Assessment/Plan:    Chronic diarrhea - Ongoing diarrhea for 2-3 months occurring after meals and made better by bowel movements  Associated with lower abdominal cramping  No recent colonoscopy, weight loss, travel, ABX  use   -  ESR, CRP, stool bacterial PCR, TSH, fecal leukocytes and fecal fat, calprotectin, stool osmolality, stool sodium, stool enteric bacterial panel by PCR - pending  - GI consulted for colonoscopy, currently on clears  - mild iv fluid hydration NS 75cc/hour    Abdominal cramping - bentyl 20mg -4x times daily     Hematochezia  - began 3 days ago is progressively worsened  Notes 3 large bloody bowel movements today feeling toilet bowl in not mixed with stool  Denies any hx  Of GI diseases such as cancer, IBD, diverticulosis, diverticulitis  - Hemoglobin stable at 13    Bipolar disease - continue home meds     Possible hepatic cysts per CT -  Follow-up hepatic ultrasound recommended to confirm simple cysts as an outpatient  - CT abdomen and pelvis - Interval development of several hypodensities within the liver, most of which are less than 1 cm in size  There is a larger hypodensity within the inferior aspect of the liver below the gallbladder fossa measuring 2 1 cm in diameter  These may   represent hepatic cysts  Hx of endometrial cancer - s/p hysterectomy and oophorectomy at age 29       Principal Problem:    Bright red blood per rectum  Active Problems:    Chronic diarrhea    Tobacco abuse    Disposition: per SOD     Subjective:  Patient admits to lower abdominal cramping, asking for pain medications  Also concerned about diarrhea          Vitals: Temp (24hrs), Av °F (36 7 °C), Min:97 6 °F (36 4 °C), Max:98 7 °F (37 1 °C)  Current: Temperature: 97 7 °F (36 5 °C)  Vitals:    18 2127 06/10/18 0104 06/10/18 0122 06/10/18 0745   BP: 103/61 (!) 87/49 100/60 110/57   BP Location: Right arm  Right arm Right arm   Pulse: 57 55  57   Resp: 20 20  20   Temp: 98 7 °F (37 1 °C) 97 8 °F (36 6 °C)  97 7 °F (36 5 °C)   TempSrc: Oral Oral  Oral   SpO2: 96% 94%  96%   Weight: 104 kg (228 lb 6 3 oz)      Height: 5' 9" (1 753 m)       Body mass index is 33 73 kg/m²  I/O last 24 hours: In: 445 [P O :120; I V :325]  Out: 550 [Urine:550]      Physical Exam:   Physical Exam   Constitutional: She appears well-developed and well-nourished  obese   HENT:   Head: Normocephalic and atraumatic  Eyes: Pupils are equal, round, and reactive to light  Neck: Neck supple  Cardiovascular: Normal rate and regular rhythm  No murmur heard  Pulmonary/Chest: Effort normal and breath sounds normal  No respiratory distress  She has no wheezes  Abdominal: Soft  Bowel sounds are normal  She exhibits no distension  There is tenderness  Low abdominal tenderness   Musculoskeletal: She exhibits no edema  Neurological: She is alert  Skin: Skin is warm and dry  Psychiatric: She has a normal mood and affect         Invasive Devices     Peripheral Intravenous Line            Peripheral IV 06/09/18 Left Antecubital less than 1 day                Labs:   Recent Results (from the past 24 hour(s))   CBC and differential    Collection Time: 06/09/18  5:47 PM   Result Value Ref Range    WBC 6 08 4 31 - 10 16 Thousand/uL    RBC 4 38 3 81 - 5 12 Million/uL    Hemoglobin 13 2 11 5 - 15 4 g/dL    Hematocrit 39 2 34 8 - 46 1 %    MCV 90 82 - 98 fL    MCH 30 1 26 8 - 34 3 pg    MCHC 33 7 31 4 - 37 4 g/dL    RDW 13 6 11 6 - 15 1 %    MPV 8 9 8 9 - 12 7 fL    Platelets 140 215 - 861 Thousands/uL    nRBC 0 /100 WBCs    Neutrophils Relative 35 (L) 43 - 75 %    Immat GRANS % 0 0 - 2 %    Lymphocytes Relative 50 (H) 14 - 44 %    Monocytes Relative 10 4 - 12 %    Eosinophils Relative 4 0 - 6 %    Basophils Relative 1 0 - 1 %    Neutrophils Absolute 2 11 1 85 - 7 62 Thousands/µL    Immature Grans Absolute 0 01 0 00 - 0 20 Thousand/uL    Lymphocytes Absolute 3 04 0 60 - 4 47 Thousands/µL    Monocytes Absolute 0 63 0 17 - 1 22 Thousand/µL    Eosinophils Absolute 0 25 0 00 - 0 61 Thousand/µL    Basophils Absolute 0 04 0 00 - 0 10 Thousands/µL   Comprehensive metabolic panel    Collection Time: 06/09/18  5:47 PM   Result Value Ref Range    Sodium 140 136 - 145 mmol/L    Potassium 3 9 3 5 - 5 3 mmol/L    Chloride 110 (H) 100 - 108 mmol/L    CO2 24 21 - 32 mmol/L    Anion Gap 6 4 - 13 mmol/L    BUN 9 5 - 25 mg/dL    Creatinine 0 98 0 60 - 1 30 mg/dL    Glucose 98 65 - 140 mg/dL    Calcium 8 7 8 3 - 10 1 mg/dL    AST 12 5 - 45 U/L    ALT 27 12 - 78 U/L    Alkaline Phosphatase 112 46 - 116 U/L    Total Protein 7 0 6 4 - 8 2 g/dL    Albumin 3 5 3 5 - 5 0 g/dL    Total Bilirubin 0 24 0 20 - 1 00 mg/dL    eGFR 65 ml/min/1 73sq m   Lipase    Collection Time: 06/09/18  5:47 PM   Result Value Ref Range    Lipase 83 73 - 393 u/L   C-reactive protein    Collection Time: 06/09/18  5:47 PM   Result Value Ref Range    CRP 6 6 (H) <3 0 mg/L   IgA    Collection Time: 06/09/18  5:47 PM   Result Value Ref Range     0 70 0 - 400 0 mg/dL   POCT urinalysis dipstick    Collection Time: 06/09/18  6:45 PM   Result Value Ref Range    Color, UA -    ED Urine Macroscopic    Collection Time: 06/09/18  6:52 PM   Result Value Ref Range    Color, UA Yellow     Clarity, UA Clear     pH, UA 8 5 (H) 4 5 - 8 0    Leukocytes, UA Negative Negative    Nitrite, UA Negative Negative    Protein, UA Trace (A) Negative mg/dl    Glucose, UA Negative Negative mg/dl    Ketones, UA Negative Negative mg/dl    Urobilinogen, UA 1 0 0 2, 1 0 E U /dl E U /dl    Bilirubin, UA Negative Negative    Blood, UA Moderate (A) Negative    Specific Gravity, UA 1 015 1 003 - 1 030   Urine Microscopic    Collection Time: 06/09/18  6:52 PM   Result Value Ref Range    RBC, UA 4-10 (A) None Seen, 0-5 /hpf    WBC, UA 2-4 (A) None Seen, 0-5, 5-55, 5-65 /hpf    Epithelial Cells Occasional None Seen, Occasional /hpf    Bacteria, UA Occasional None Seen, Occasional /hpf    Hyaline Casts, UA None Seen None Seen /lpf   Protime-INR    Collection Time: 06/09/18 10:08 PM   Result Value Ref Range    Protime 13 3 11 8 - 14 2 seconds    INR 1 00 0 86 - 1 17   Hemoglobin A1C    Collection Time: 06/09/18 10:08 PM   Result Value Ref Range    Hemoglobin A1C 5 8 4 2 - 6 3 %     mg/dl   Fasting Lipid panel    Collection Time: 06/09/18 10:08 PM   Result Value Ref Range    Cholesterol 276 (H) 50 - 200 mg/dL    Triglycerides 127 <=150 mg/dL    HDL, Direct 49 40 - 60 mg/dL    LDL Calculated 202 (H) 0 - 100 mg/dL    Non-HDL-Chol (CHOL-HDL) 227 mg/dl   Basic metabolic panel    Collection Time: 06/10/18  5:35 AM   Result Value Ref Range    Sodium 139 136 - 145 mmol/L    Potassium 3 8 3 5 - 5 3 mmol/L    Chloride 111 (H) 100 - 108 mmol/L    CO2 23 21 - 32 mmol/L    Anion Gap 5 4 - 13 mmol/L    BUN 12 5 - 25 mg/dL    Creatinine 0 71 0 60 - 1 30 mg/dL    Glucose 88 65 - 140 mg/dL    Calcium 8 1 (L) 8 3 - 10 1 mg/dL    eGFR 96 ml/min/1 73sq m   CBC and differential    Collection Time: 06/10/18  5:35 AM   Result Value Ref Range    WBC 5 94 4 31 - 10 16 Thousand/uL    RBC 3 92 3 81 - 5 12 Million/uL    Hemoglobin 11 5 11 5 - 15 4 g/dL    Hematocrit 35 7 34 8 - 46 1 %    MCV 91 82 - 98 fL    MCH 29 3 26 8 - 34 3 pg    MCHC 32 2 31 4 - 37 4 g/dL    RDW 13 7 11 6 - 15 1 %    MPV 9 2 8 9 - 12 7 fL    Platelets 404 337 - 989 Thousands/uL    nRBC 0 /100 WBCs    Neutrophils Relative 45 43 - 75 %    Immat GRANS % 0 0 - 2 %    Lymphocytes Relative 42 14 - 44 %    Monocytes Relative 9 4 - 12 %    Eosinophils Relative 3 0 - 6 %    Basophils Relative 1 0 - 1 %    Neutrophils Absolute 2 67 1 85 - 7 62 Thousands/µL    Immature Grans Absolute 0 02 0 00 - 0 20 Thousand/uL    Lymphocytes Absolute 2 50 0 60 - 4 47 Thousands/µL    Monocytes Absolute 0 55 0 17 - 1 22 Thousand/µL    Eosinophils Absolute 0 17 0 00 - 0 61 Thousand/µL    Basophils Absolute 0 03 0 00 - 0 10 Thousands/µL       Radiology Results: I have personally reviewed pertinent reports  Other Diagnostic Testing:   I have personally reviewed pertinent reports          Active Meds:   Current Facility-Administered Medications   Medication Dose Route Frequency    acetaminophen (TYLENOL) tablet 650 mg  650 mg Oral Q6H PRN    ARIPiprazole (ABILIFY) tablet 10 mg  10 mg Oral Daily    busPIRone (BUSPAR) tablet 30 mg  30 mg Oral BID    carBAMazepine (TEGretol) tablet 200 mg  200 mg Oral BID    cyclobenzaprine (FLEXERIL) tablet 10 mg  10 mg Oral TID PRN    dicyclomine (BENTYL) capsule 20 mg  20 mg Oral 4x Daily (AC & HS)    escitalopram (LEXAPRO) tablet 5 mg  5 mg Oral HS    escitalopram (LEXAPRO) tablet 5 mg  5 mg Oral Daily    gabapentin (NEURONTIN) capsule 600 mg  600 mg Oral TID    hydrALAZINE (APRESOLINE) injection 5 mg  5 mg Intravenous Q4H PRN    ketorolac (TORADOL) injection 15 mg  15 mg Intravenous Q6H PRN    melatonin tablet 3 mg  3 mg Oral HS    nicotine (NICODERM CQ) 21 mg/24 hr TD 24 hr patch 1 patch  1 patch Transdermal Daily    sodium chloride 0 9 % infusion  125 mL/hr Intravenous Continuous    topiramate (TOPAMAX) tablet 100 mg  100 mg Oral BID         VTE Pharmacologic Prophylaxis: Reason for no pharmacologic prophylaxis ambulating  VTE Mechanical Prophylaxis: sequential compression device    Shaniqua Son MD

## 2018-06-10 NOTE — H&P
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, alcohol abuse, nicotine abuse the presents for evaluation of lower abdominal pain, diarrhea, bright red per rectum  Patient states that 2 months ago started having lower abdominal pain that was intermittent, aching, squeezing, worsened by food, improved with bowel movements  At that time she also noted diarrhea that occurred every time she would eat  She denies any fevers, chills, appetite changes, weight changes  She denies any sick contacts, recent antibiotic use, recent travel  Three days ago she noted some bright red per rectum that occurred once initially but today she noted 3 large bowel movements the filled the toilet bowl and was not mixed with stool  She denies any history of colon cancer, liver disease  Denies history of IBD, ASA use, anticoagulation use, hemorrhoids  Unable to obtain family history as patient has been in [place] since she was 7 months of age  She notes her last colonoscopy was 25 years ago when she had her endometrial cancer at that time she had some bright red per rectum  She notes a significant drinking history for 30 years the but recently quit 2017  She notes significant smoking history 1/2 packs per day the and notes she has been smoking since she was 8years old    On arrival vitals were all within normal limits  CBC showed no significant abnormalities with a hemoglobin approximately 13  CMP revealed no significant abnormalities      Review of Systems Review of Systems   Constitutional: Negative for activity change, appetite change, chills, diaphoresis, fever and unexpected weight change  HENT: Negative for congestion, facial swelling and rhinorrhea  Eyes: Negative for photophobia and visual disturbance  Respiratory: Negative for cough, shortness of breath and wheezing  Cardiovascular: Negative for chest pain and palpitations  Gastrointestinal: Positive for abdominal pain, blood in stool and diarrhea  Negative for constipation, nausea and vomiting  Genitourinary: Negative for decreased urine volume, difficulty urinating, dysuria, flank pain, frequency, hematuria and urgency  Musculoskeletal: Negative for arthralgias, back pain, joint swelling and myalgias  Neurological: Negative for dizziness, syncope, facial asymmetry, light-headedness, numbness and headaches  Psychiatric/Behavioral: Negative for confusion and decreased concentration  The patient is not nervous/anxious  Past Medical History     Past Medical History:   Diagnosis Date    Bipolar 1 disorder ([facility] [place] [facility] )     Cancer ([facility] [place] [facility] )     ovarian    Depression        Past Surgical History     Past Surgical History:   Procedure Laterality Date    HYSTERECTOMY      JOINT REPLACEMENT Bilateral     knee       Social History     History   Alcohol Use No     History   Drug Use No     History   Smoking Status    Current Every Day Smoker   Smokeless Tobacco    Never Used       Family History   History reviewed  No pertinent family history  Medications Prior to Admission     Prior to Admission medications    Medication Sig Start Date End Date Taking?  Authorizing Provider   ARIPiprazole (ABILIFY) 10 mg tablet Take 10 mg by mouth daily   Yes Historical Provider, MD   Aspirin-Acetaminophen-Caffeine (EXCEDRIN PO) Take 2 tablets by mouth 4 (four) times a day as needed Pain reliever plus   Yes Historical Provider, MD   busPIRone (BUSPAR) 30 MG tablet Take 30 mg by mouth 2 (two) times a day   Yes Historical Provider, MD   carBAMazepine (TEGretol) 200 mg tablet Take 200 mg by mouth 2 (two) times a day   Yes Historical Provider, MD   cyclobenzaprine (FLEXERIL) 10 mg tablet Take 10 mg by mouth 3 (three) times a day as needed for muscle spasms   Yes Historical Provider, MD   escitalopram (LEXAPRO) 10 mg tablet Take 5 mg by mouth daily at bedtime     Yes Historical Provider, MD   escitalopram (LEXAPRO) 5 mg tablet Take 5 mg by mouth daily AM    Yes Historical Provider, MD   gabapentin (NEURONTIN) 600 MG tablet Take 600 mg by mouth 3 (three) times a day   Yes Historical Provider, MD   hydrOXYzine HCL (ATARAX) 50 mg tablet Take 50 mg by mouth 4 (four) times a day as needed for itching   Yes Historical Provider, MD   ibuprofen (MOTRIN) 600 mg tablet Take 600 mg by mouth 4 (four) times a day as needed for mild pain   Yes Historical Provider, MD   melatonin 3 mg Take 3 mg by mouth daily at bedtime   Yes Historical Provider, MD   propranolol (INDERAL) 20 mg tablet Take 20 mg by mouth daily AM   Yes Historical Provider, MD   propranolol (INDERAL) 40 mg tablet Take 40 mg by mouth daily at bedtime   Yes Historical Provider, MD   SUMAtriptan (IMITREX) 100 mg tablet Take 100 mg by mouth once as needed for migraine   Yes Historical Provider, MD   topiramate (TOPAMAX) 100 mg tablet Take 100 mg by mouth 2 (two) times a day   Yes Historical Provider, MD       Allergies     Allergies   Allergen Reactions    Latex     Other      Adhesive tape, Natural rubber       Objective     Vitals:    06/09/18 1732 06/09/18 1903   BP: 139/63 120/69   BP Location: Left arm Right arm   Pulse: 73 64   Resp: 18 18   Temp: 97 6 °F (36 4 °C)    TempSrc: Oral    SpO2: 98% 99%   Weight: 107 kg (235 lb)    Height: 5' 9" (1 753 m)      Body mass index is 34 7 kg/m²    No intake or output data in the 24 hours ending 06/09/18 2021  Invasive Devices     Peripheral Intravenous Line            Peripheral IV 06/09/18 Left Antecubital less than 1 day Physical Exam  GENERAL: Appears well-developed and well-nourished  Appears in no acute distress   HEENT: Normocephalic and atraumatic  No scleral icterus  PERRLA  EOMI B/L  No oropharyngeal edema  MM moist    NECK: Neck supple with no lymphadenopathy  Trachea midline  No JVD  CARDIOVASCULAR: S1 and S2 are present  Regular rate and rhythm  No murmurs, rubs, or gallops  RESPIRATORY: CTA B/L, no rales, rhonci or wheezes  Normal respiratory expansion  ABDOMINAL: Bowel sounds present in all 4 quadrants, non-tender, soft, non-distended  No organomegaly, rebound, or guarding  EXTREMITIES: 2+ DP and PT pulses bilaterally; no cyanosis, clubbing, edema  ROM intact  GOMEZ x4   MUSCULOSKELETAL: No joint tenderness, deformity or swelling, full range of motion without pain  NEUROLOGIC: Patient is alert and oriented to person, place, and time  No sensory or motor deficits  CN 2-12 intact  Plantars downgoing bilaterally  Speech fluent  SKIN: Skin is warm and dry  No skin lesions are present  No rashes  PSYCHIATRIC: Normal mood and affect     Lab Results: I have personally reviewed pertinent reports      CBC:   Results from last 7 days  Lab Units 06/09/18  1747   WBC Thousand/uL 6 08   RBC Million/uL 4 38   HEMOGLOBIN g/dL 13 2   HEMATOCRIT % 39 2   MCV fL 90   MCH pg 30 1   MCHC g/dL 33 7   RDW % 13 6   MPV fL 8 9   PLATELETS Thousands/uL 320   NRBC AUTO /100 WBCs 0   NEUTROS PCT % 35*   LYMPHS PCT % 50*   MONOS PCT % 10   EOS PCT % 4   BASOS PCT % 1   NEUTROS ABS Thousands/µL 2 11   LYMPHS ABS Thousands/µL 3 04   MONOS ABS Thousand/µL 0 63   EOS ABS Thousand/µL 0 25   , Chemistry Profile:   Results from last 7 days  Lab Units 06/09/18  1747   SODIUM mmol/L 140   POTASSIUM mmol/L 3 9   CHLORIDE mmol/L 110*   CO2 mmol/L 24   ANION GAP mmol/L 6   BUN mg/dL 9   CREATININE mg/dL 0 98   GLUCOSE RANDOM mg/dL 98   CALCIUM mg/dL 8 7   AST U/L 12   ALT U/L 27   ALK PHOS U/L 112   TOTAL PROTEIN g/dL 7 0   BILIRUBIN TOTAL mg/dL 0 24   EGFR ml/min/1 73sq m 65   , Coagulation Studies:   , Cardiac Studies:   , Additional Labs:   Results from last 7 days  Lab Units 06/09/18  1747   LIPASE u/L 83   , iSTAT CHEM 8:   Results from last 7 days  Lab Units 06/09/18  1747   EGFR ml/min/1 73sq m 65   GLUCOSE RANDOM mg/dL 98   HEMOGLOBIN g/dL 13 2   , ABG:   , Toxicology:   , Last A1C/Lipid Panel/Thyroid Panel: No results found for: HGBA1C, TRIG, CHOL, HDL, LDLCALC, YOL8XPHOTIIA, T3FREE, I5UUJJQ, FREET4    Imaging: I have personally reviewed pertinent films in PACS  Ct Abdomen Pelvis With Contrast    Result Date: 6/9/2018  Narrative: CT ABDOMEN AND PELVIS WITH IV CONTRAST INDICATION:   abd pain  COMPARISON: 7/30/2004 TECHNIQUE:  CT examination of the abdomen and pelvis was performed  Axial, sagittal, and coronal 2D reformatted images were created from the source data and submitted for interpretation  Radiation dose length product (DLP) for this visit:  838 4 mGy-cm   This examination, like all CT scans performed in the [facility], was performed utilizing techniques to minimize radiation dose exposure, including the use of iterative reconstruction and automated exposure control  IV Contrast:  100 mL of iohexol (OMNIPAQUE) Enteric Contrast:  Enteric contrast was not administered  FINDINGS: ABDOMEN LOWER CHEST:  Moderate passive atelectatic change within the lung bases posteriorly  Small pulmonary nodule within the lateral aspect of the right lung measuring 4 mm  Small pulmonary nodule within the left lung base laterally as well measuring 5 mm  These are unchanged from remote exam  LIVER/BILIARY TREE:  Multiple hepatic hypodensities are noted which are less than 1 cm in size  There is a larger hypodensity noted within the anterior segment of the right lobe of the liver immediately below the gallbladder fossa, series 2 image 34 measuring 2 1 cm  None of these hypodensities were present on the prior examination  GALLBLADDER:  No calcified gallstones  No pericholecystic inflammatory change  SPLEEN:  Unremarkable  PANCREAS:  Unremarkable  ADRENAL GLANDS:  Unremarkable  KIDNEYS/URETERS:  Unremarkable  No hydronephrosis  STOMACH AND BOWEL:  Unremarkable  APPENDIX:  No findings to suggest appendicitis  ABDOMINOPELVIC CAVITY:  No ascites or free intraperitoneal air  No lymphadenopathy  VESSELS:  Atherosclerotic changes are present  No evidence of aneurysm  PELVIS REPRODUCTIVE ORGANS:  Patient is status post hysterectomy  URINARY BLADDER:  Unremarkable  ABDOMINAL WALL/INGUINAL REGIONS:  Unremarkable  OSSEOUS STRUCTURES:  Mild chronic compression fracture of the T12 superior endplate  No acute osseous abnormality  Impression: Interval development of several hypodensities within the liver, most of which are less than 1 cm in size  There is a larger hypodensity within the inferior aspect of the liver below the gallbladder fossa measuring 2 1 cm in diameter  These may represent hepatic cysts  Follow-up hepatic ultrasound recommended to confirm simple cysts  This can be performed on an outpatient basis  Passive atelectatic change within the lung bases posteriorly  The study was marked in EPIC for significant notification  Workstation performed: [id number]       Microbiology: cultures obtained in emergency department include     Urinalysis:   Results from last 7 days  Lab Units 06/09/18  1852   COLOR UA  Yellow   CLARITY UA  Clear   SPEC GRAV UA  1 015   PH UA  8 5*   LEUKOCYTES UA  Negative   NITRITE UA  Negative   PROTEIN UA mg/dl Trace*   GLUCOSE UA mg/dl Negative   KETONES UA mg/dl Negative   BILIRUBIN UA  Negative   BLOOD UA  Moderate*        Urine Micro:   Results from last 7 days  Lab Units 06/09/18  1852   RBC UA /hpf 4-10*   WBC UA /hpf 2-4*   EPITHELIAL CELLS WET PREP /hpf Occasional   BACTERIA UA /hpf Occasional        EKG, Pathology, and Other Studies: I have personally reviewed pertinent reports        Medications given in Emergency Department     Medication Administration - last 24 hours from 06/08/2018 2021 to 06/09/2018 2021       Date/Time Order Dose Route Action Action by     06/09/2018 1811 morphine (PF) 10 mg/mL injection 6 mg   Intravenous Canceled Entry Kaitlin Alvarado RN     06/09/2018 1805 ondansetron (ZOFRAN) injection 4 mg 4 mg Intravenous Given Kaitlin Alvarado RN     06/09/2018 1811 morphine (PF) 4 mg/mL injection 6 mg 6 mg Intravenous Given Kaitlin Alvarado RN     06/09/2018 1826 iohexol (OMNIPAQUE) 350 MG/ML injection (MULTI-DOSE) 100 mL 100 mL Intravenous Given Lisa Hood     06/09/2018 1934 morphine (PF) 10 mg/mL injection 6 mg 6 mg Intravenous Not Given Lovell Cabot, RN     06/09/2018 1908 morphine (PF) 4 mg/mL injection 4 mg 4 mg Intravenous Given Lovell Cabot, RN          Assessment and Plan       Code Status: Level 1 - Full Code  VTE Pharmacologic Prophylaxis: Sequential compression device (Venodyne)    VTE Mechanical Prophylaxis: sequential compression device  Admission Status: INPATIENT     1  Chronic Diarrhea  · Ongoing diarrhea for 2-3 months occurring after meals and made better by bowel movements  Associated with lower abdominal pain also improved by bowel movements  No recent weight changes  No recent antibiotics, travel, sick contacts  · Patient with no recent colonoscopies reports had a colonoscopy 20 years ago when she had endometrial cancer and appendiceal cancer that time she had bright red blood per rectum had a colonoscopy performed  She has never had problems with diarrhea constipation previously  · Differential at this time includes osmotic diarrhea given diarrhea improved with fasting versus malabsorptive causes of versus less likely inflammatory bowel disease  Patient did have radiation performed 20 years ago so radiation enteritis should also be considered    Given new onset diarrhea and no colonoscopy in the past 20 years also should consider colon cancer  · Will check ESR, CRP, stool bacterial PCR  · Will check TSH  · Will check fecal leukocytes and fecal fat  · Will place on clear liquid diet  · Consider GI consult for colonoscopy to rule out colon cancer    Bright red blood per rectum  · Notes began 3 days ago is progressively worsened  Notes 3 large bloody bowel movements today feeling toilet bowl in not mixed with stool  Not associated with straining or constipation  No history diverticulosis, hemorrhoids, IBD, or any GI medical problems  · Vitals within normal limits  · Hemoglobin stable at 13  · New onset bright red blood per rectum could represent new onset colon cancer versus diverticulosis versus radiation enteritis versus possible IBD  · Will trend hemoglobins  · Monitor for any hematochezia, melena  · Gentle IVF    Bipolar  · Continue home meds    Possible hepatic cysts  · CT scan of abdomen on arrival showed several hypodensities within the liver, most of which are less than 1 cm in size  There was also a arger hypodensity within the inferior aspect of the liver below the gallbladder fossa measuring 2 1 cm in diameter  · These may represent hepatic cysts  · Follow-up hepatic ultrasound recommended to confirm simple cysts  Admission Time  I spent 1 hour admitting the patient  This involved direct patient contact where I performed a full history and physical, reviewing previous records, and reviewing laboratory and other diagnostic studies      Rogerio Cordova MD  Internal Medicine  PGY-1

## 2018-06-10 NOTE — CASE MANAGEMENT
Initial Clinical Review    Admission: Date/Time/Statement:   OBS  ORDER    6/9  @   1959     Orders Placed This Encounter   Procedures    Place in Observation (expected length of stay for this patient is less than two midnights)     Standing Status:   Standing     Number of Occurrences:   1     Order Specific Question:   Admitting Physician     Answer:   CEE HORN [[id number]]     Order Specific Question:   Level of Care     Answer:   Med Surg [[id number]]         ED: Date/Time/Mode of Arrival:   ED Arrival Information     Expected Arrival Acuity Means of Arrival Escorted By Service Admission Type    - 6/9/2018 17:28 Urgent Ambulance [facility] General Medicine Urgent    Arrival Complaint    abd  pain          Chief Complaint:   Chief Complaint   Patient presents with    Abdominal Pain     Pt c/o chronic abdominal pain for the last 4-5 months after eating, on and off, however, for the last 3 days pain has been constant and has noticed bright red blood in stools over the last 2 days  Pt concerned, here for evaluation       History of Illness: Jenae Almaraz is a 56 y o  female past medical history significant of endometrial cancer, 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, alcohol abuse, nicotine abuse the presents for evaluation of lower abdominal pain, diarrhea, bright red per rectum  Patient states that 2 months ago started having lower abdominal pain that was intermittent, aching, squeezing, worsened by food, improved with bowel movements  At that time she also noted diarrhea that occurred every time she would eat  She denies any fevers, chills, appetite changes, weight changes  She denies any sick contacts, recent antibiotic use, recent travel  Three days ago she noted some bright red per rectum that occurred once initially but today she noted 3 large bowel movements the filled the toilet bowl and was not mixed with stool  She denies any history of colon cancer, liver disease   Denies history of IBD, ASA use, anticoagulation use, hemorrhoids  Unable to obtain family history as patient has been in origin since she was 7 months of age  She notes her last colonoscopy was 25 years ago when she had her endometrial cancer at that time she had some bright red per rectum  She notes a significant drinking history for 30 years the but recently quit October 2017  She notes significant smoking history 1/2 packs per day the and notes she has been smoking since she was 8years old  On arrival vitals were all within normal limits  CBC showed no significant abnormalities with a hemoglobin approximately 13  CMP revealed no significant abnormalities       ED Vital Signs:   ED Triage Vitals [06/09/18 1732]   Temperature Pulse Respirations Blood Pressure SpO2   97 6 °F (36 4 °C) 73 18 139/63 98 %      Temp Source Heart Rate Source Patient Position - Orthostatic VS BP Location FiO2 (%)   Oral Monitor Lying Left arm --      Pain Score       8        Wt Readings from Last 1 Encounters:   06/09/18 104 kg (228 lb 6 3 oz)       Vital Signs (abnormal):    above    Abnormal Labs/Diagnostic Test Results:   H/H   13 2/39 2   (  6/9)                                                                                     11 5/35 7   (  6/10)  Ct  abd/pelvis:    Interval development of several hypodensities within the liver, most of which are less than 1 cm in size   There is a larger hypodensity within the inferior aspect of the liver below the gallbladder fossa measuring 2 1 cm in diameter   These may   represent hepatic cysts   Follow-up hepatic ultrasound recommended to confirm simple cysts   This can be performed on an outpatient basis  Passive atelectatic change within the lung bases posteriorly      ED Treatment:   Medication Administration from 06/09/2018 1728 to 06/09/2018 2100       Date/Time Order Dose Route Action Action by Comments     06/09/2018 1811 morphine (PF) 10 mg/mL injection 6 mg   Intravenous Canceled Entry Yvonne Ramirez, RN order changed     06/09/2018 1805 ondansetron (ZOFRAN) injection 4 mg 4 mg Intravenous Given Kaitlin Alvarado RN      06/09/2018 1811 morphine (PF) 4 mg/mL injection 6 mg 6 mg Intravenous Given Kaitlin Alvarado RN      06/09/2018 1826 iohexol (OMNIPAQUE) 350 MG/ML injection (MULTI-DOSE) 100 mL 100 mL Intravenous Given Dennie Gent      06/09/2018 1934 morphine (PF) 10 mg/mL injection 6 mg 6 mg Intravenous Not Given Sherri Carr RN      06/09/2018 1908 morphine (PF) 4 mg/mL injection 4 mg 4 mg Intravenous Given Sherri Carr RN           Past Medical/Surgical History: Active Ambulatory Problems     Diagnosis Date Noted    No Active Ambulatory Problems     Resolved Ambulatory Problems     Diagnosis Date Noted    No Resolved Ambulatory Problems     Past Medical History:   Diagnosis Date    Bipolar 1 disorder ([facility] )     Cancer ([facility] )     Depression        Admitting Diagnosis: Abdominal pain [R10 9]  GI bleed [K92 2]  Liver lesion [K76 9]    Age/Sex: 56 y o  female    Assessment/Plan: Chronic Diarrhea  · Ongoing diarrhea for 2-3 months occurring after meals and made better by bowel movements  Associated with lower abdominal pain also improved by bowel movements  No recent weight changes  No recent antibiotics, travel, sick contacts  · Patient with no recent colonoscopies reports had a colonoscopy 20 years ago when she had endometrial cancer and appendiceal cancer that time she had bright red blood per rectum had a colonoscopy performed  She has never had problems with diarrhea constipation previously  · Differential at this time includes osmotic diarrhea given diarrhea improved with fasting versus malabsorptive causes of versus less likely inflammatory bowel disease  Patient did have radiation performed 20 years ago so radiation enteritis should also be considered    Given new onset diarrhea and no colonoscopy in the past 20 years also should consider colon cancer  · Will check ESR, CRP, stool bacterial PCR  · Will check TSH  · Will check fecal leukocytes and fecal fat  · Will place on clear liquid diet  · Consider GI consult for colonoscopy to rule out colon cancer     Bright red blood per rectum  · Notes began 3 days ago is progressively worsened  Notes 3 large bloody bowel movements today feeling toilet bowl in not mixed with stool  Not associated with straining or constipation  No history diverticulosis, hemorrhoids, IBD, or any GI medical problems    · Vitals within normal limits  · Hemoglobin stable at 13  · New onset bright red blood per rectum could represent new onset colon cancer versus diverticulosis versus radiation enteritis versus possible IBD  · Will trend hemoglobins  · Monitor for any hematochezia, melena  Gentle IVF  Bipolar  · Continue home meds     Possible hepatic cysts  · CT scan of abdomen on arrival showed several hypodensities within the liver, most of which are less than 1 cm in size   There was also a arger hypodensity within the inferior aspect of the liver below the gallbladder fossa measuring 2 1 cm in diameter     · These may represent hepatic cysts     · Follow-up hepatic ultrasound recommended to confirm simple cysts        Admission Orders:   OBS  ORDER   6/9  @  1959  Scheduled Meds:   Current Facility-Administered Medications:  acetaminophen 650 mg Oral Q6H PRN Amaury Bennett MD    ARIPiprazole 10 mg Oral Daily Amaury Bennett MD    busPIRone 30 mg Oral BID Amaury Bennett MD    carBAMazepine 200 mg Oral BID Amaury Bennett MD    cyclobenzaprine 10 mg Oral TID PRN Amaury Bennett MD    dicyclomine 20 mg Oral 4x Daily (AC & HS) Litzy Lane MD    escitalopram 5 mg Oral HS Amaury Bennett MD    escitalopram 5 mg Oral Daily Amaury Bennett MD    gabapentin 600 mg Oral TID Amaury Bennett MD    hydrALAZINE 5 mg Intravenous Q4H PRN Amaury Bennett MD    ketorolac 15 mg Intravenous Q6H PRN Amaury Bennett MD    melatonin 3 mg Oral HS Michael Johns MD nicotine 1 patch Transdermal Daily Amaury Bennett MD    sodium chloride 125 mL/hr Intravenous Continuous Amaury Bennett MD Last Rate: 125 mL/hr (06/10/18 0151)   topiramate 100 mg Oral BID Amaury Bennett MD      Continuous Infusions:   sodium chloride 125 mL/hr Last Rate: 125 mL/hr (06/10/18 0151)     PRN Meds:   acetaminophen    cyclobenzaprine    hydrALAZINE    ketorolac     Cons  GI  Cl liq  Diet  IV  toradol PRN  ( x1  6/9  And  X 1  6/10  Thus far)  Plan  Colonoscopy   6/11      Thank you,  [address] in the [facility] [address] for 2017  Network Utilization Review Department  Phone: [phone]; Fax [phone]  ATTENTION: The Network Utilization Review Department is now centralized for our 7 Facilities  Make a note that we have a new phone and fax numbers for our Department  Please call with any questions or concerns to [phone] and carefully follow the prompts so that you are directed to the right person  All voicemails are confidential  Fax any determinations, approvals, denials, and requests for initial or continue stay review clinical to [phone]  Due to HIGH CALL volume, it would be easier if you could please send faxed requests to expedite your requests and in part, help us provide discharge notifications faster

## 2018-06-10 NOTE — CONSULTS
82-year-old woman with history of irritable bowel and chronic diarrhea  Patient was admitted because of worsening of lower abdominal pain, worsening diarrhea and 3 days of bright red blood per rectum  No nausea, no vomiting  No lightheadedness, no dizziness  No anemia  The most recent colonoscopy about 10 years ago  Past medical history:  Irritable bowel  History of endometrial cancer  Status post chemotherapy and radiation therapy 20 years ago  Hysterectomy  Bilateral knee replacement  Depression  Allergies:  Latex  Outpatient medications:  Reviewed  Review of the systems:    General:  Denies fever, chills, weakness, fatigue, weight loss  Cardiovascular:  Denies chest pains, palpitations  Pulmonary:  Denies cough, shortness of breath  Abdomen:  As per H and P, chronic diarrhea, chronic lower abdominal pain  Musculoskeletal:  Negative for arthralgias, negative for ambulatory dysfunction, negative for back pain  :  Denies urinary symptoms, urinary frequency, hematuria  Physical exam:  Temp 97 7°, heart rate 57, /57  The patient is awake, comfortable  Skin:  Warm, good color  Head and neck:  Normal oral mucosa, no jugular venous distention, neck is supple  No masses  Lungs:  Clear, no wheezing  Heart:  Regular, no murmur  Abdomen:  Soft, with mild lower abdominal tenderness on deep palpation, no guarding, no rebound, no mass, no ascites  Extremities:  Warm, no calf tenderness, no edema  Neurologic:  Oriented x3, no focal abnormalities, speech is intact  Labs:  CBC normal, CMP normal, urinalysis with moderate amount of blood and microscopic hematuria  CT abdomen/pelvis:  Unremarkable except multiple hepatic hypodensities, most likely liver cysts  Impressions/recommendations:  1  Worsening diarrhea, lower abdominal pain and rectal bleeding    Differential diagnosis:  Inflammatory bowel disease, ulcerative colitis, proctitis, colonic polyps, hemorrhoids or anal fissure  Rule out microscopic colitis  Rule out exacerbation of irritable bowel  Colonoscopy tomorrow  Colonic biopsies to rule out underlying microscopic colitis

## 2018-06-11 ENCOUNTER — ANESTHESIA (OUTPATIENT)
Dept: GASTROENTEROLOGY | Facility: HOSPITAL | Age: 58
End: 2018-06-11
Payer: COMMERCIAL

## 2018-06-11 ENCOUNTER — ANESTHESIA EVENT (OUTPATIENT)
Dept: GASTROENTEROLOGY | Facility: HOSPITAL | Age: 58
End: 2018-06-11
Payer: COMMERCIAL

## 2018-06-11 VITALS
HEART RATE: 66 BPM | OXYGEN SATURATION: 97 % | TEMPERATURE: 99.9 F | SYSTOLIC BLOOD PRESSURE: 104 MMHG | BODY MASS INDEX: 33.83 KG/M2 | HEIGHT: 69 IN | DIASTOLIC BLOOD PRESSURE: 59 MMHG | RESPIRATION RATE: 18 BRPM | WEIGHT: 228.4 LBS

## 2018-06-11 PROBLEM — K76.89 HEPATIC CYST: Status: ACTIVE | Noted: 2018-06-11

## 2018-06-11 PROBLEM — E87.2 NORMAL ANION GAP METABOLIC ACIDOSIS: Status: ACTIVE | Noted: 2018-06-11

## 2018-06-11 PROBLEM — N17.9 ACUTE KIDNEY INJURY (HCC): Status: ACTIVE | Noted: 2018-06-11

## 2018-06-11 PROBLEM — E87.20 NORMAL ANION GAP METABOLIC ACIDOSIS: Status: ACTIVE | Noted: 2018-06-11

## 2018-06-11 LAB
ANION GAP SERPL CALCULATED.3IONS-SCNC: 9 MMOL/L (ref 4–13)
BASOPHILS # BLD AUTO: 0.03 THOUSANDS/ΜL (ref 0–0.1)
BASOPHILS NFR BLD AUTO: 1 % (ref 0–1)
BUN SERPL-MCNC: 7 MG/DL (ref 5–25)
C DIFF TOX GENS STL QL NAA+PROBE: NORMAL
CALCIUM SERPL-MCNC: 8.2 MG/DL (ref 8.3–10.1)
CHLORIDE SERPL-SCNC: 113 MMOL/L (ref 100–108)
CO2 SERPL-SCNC: 20 MMOL/L (ref 21–32)
CREAT SERPL-MCNC: 0.56 MG/DL (ref 0.6–1.3)
EOSINOPHIL # BLD AUTO: 0.13 THOUSAND/ΜL (ref 0–0.61)
EOSINOPHIL NFR BLD AUTO: 3 % (ref 0–6)
ERYTHROCYTE [DISTWIDTH] IN BLOOD BY AUTOMATED COUNT: 13.6 % (ref 11.6–15.1)
ERYTHROCYTE [SEDIMENTATION RATE] IN BLOOD: 12 MM/HOUR (ref 0–20)
GFR SERPL CREATININE-BSD FRML MDRD: 105 ML/MIN/1.73SQ M
GLUCOSE SERPL-MCNC: 90 MG/DL (ref 65–140)
HCT VFR BLD AUTO: 34.7 % (ref 34.8–46.1)
HGB BLD-MCNC: 11.4 G/DL (ref 11.5–15.4)
IMM GRANULOCYTES # BLD AUTO: 0 THOUSAND/UL (ref 0–0.2)
IMM GRANULOCYTES NFR BLD AUTO: 0 % (ref 0–2)
LYMPHOCYTES # BLD AUTO: 1.82 THOUSANDS/ΜL (ref 0.6–4.47)
LYMPHOCYTES NFR BLD AUTO: 43 % (ref 14–44)
MCH RBC QN AUTO: 29.4 PG (ref 26.8–34.3)
MCHC RBC AUTO-ENTMCNC: 32.9 G/DL (ref 31.4–37.4)
MCV RBC AUTO: 89 FL (ref 82–98)
MONOCYTES # BLD AUTO: 0.35 THOUSAND/ΜL (ref 0.17–1.22)
MONOCYTES NFR BLD AUTO: 8 % (ref 4–12)
NEUTROPHILS # BLD AUTO: 1.94 THOUSANDS/ΜL (ref 1.85–7.62)
NEUTS SEG NFR BLD AUTO: 45 % (ref 43–75)
NRBC BLD AUTO-RTO: 0 /100 WBCS
PLATELET # BLD AUTO: 241 THOUSANDS/UL (ref 149–390)
PMV BLD AUTO: 8.9 FL (ref 8.9–12.7)
POTASSIUM SERPL-SCNC: 3.7 MMOL/L (ref 3.5–5.3)
RBC # BLD AUTO: 3.88 MILLION/UL (ref 3.81–5.12)
SODIUM SERPL-SCNC: 142 MMOL/L (ref 136–145)
TSH SERPL DL<=0.05 MIU/L-ACNC: 0.47 UIU/ML (ref 0.36–3.74)
TTG IGA SER-ACNC: <2 U/ML (ref 0–3)
WBC # BLD AUTO: 4.27 THOUSAND/UL (ref 4.31–10.16)

## 2018-06-11 PROCEDURE — 88305 TISSUE EXAM BY PATHOLOGIST: CPT | Performed by: PATHOLOGY

## 2018-06-11 PROCEDURE — 85025 COMPLETE CBC W/AUTO DIFF WBC: CPT | Performed by: INTERNAL MEDICINE

## 2018-06-11 PROCEDURE — 85652 RBC SED RATE AUTOMATED: CPT | Performed by: INTERNAL MEDICINE

## 2018-06-11 PROCEDURE — 99238 HOSP IP/OBS DSCHRG MGMT 30/<: CPT | Performed by: INTERNAL MEDICINE

## 2018-06-11 PROCEDURE — 84443 ASSAY THYROID STIM HORMONE: CPT | Performed by: INTERNAL MEDICINE

## 2018-06-11 PROCEDURE — 80048 BASIC METABOLIC PNL TOTAL CA: CPT | Performed by: INTERNAL MEDICINE

## 2018-06-11 RX ORDER — LOPERAMIDE HYDROCHLORIDE 2 MG/1
2 CAPSULE ORAL 3 TIMES DAILY PRN
Qty: 90 CAPSULE | Refills: 0 | Status: SHIPPED | OUTPATIENT
Start: 2018-06-11 | End: 2018-07-11

## 2018-06-11 RX ORDER — DICYCLOMINE HYDROCHLORIDE 10 MG/1
10 CAPSULE ORAL
Status: DISCONTINUED | OUTPATIENT
Start: 2018-06-11 | End: 2018-06-11

## 2018-06-11 RX ORDER — NICOTINE 21 MG/24HR
1 PATCH, TRANSDERMAL 24 HOURS TRANSDERMAL DAILY
Qty: 28 PATCH | Refills: 0 | Status: SHIPPED | OUTPATIENT
Start: 2018-06-12 | End: 2018-07-06

## 2018-06-11 RX ORDER — ACETAMINOPHEN 325 MG/1
650 TABLET ORAL EVERY 6 HOURS PRN
Qty: 30 TABLET | Refills: 0 | Status: SHIPPED | OUTPATIENT
Start: 2018-06-11 | End: 2018-06-21

## 2018-06-11 RX ORDER — DICYCLOMINE HCL 20 MG
20 TABLET ORAL EVERY 6 HOURS
Qty: 120 TABLET | Refills: 1 | Status: SHIPPED | OUTPATIENT
Start: 2018-06-11 | End: 2020-01-15

## 2018-06-11 RX ORDER — PROPOFOL 10 MG/ML
INJECTION, EMULSION INTRAVENOUS AS NEEDED
Status: DISCONTINUED | OUTPATIENT
Start: 2018-06-11 | End: 2018-06-11 | Stop reason: SURG

## 2018-06-11 RX ORDER — LIDOCAINE 50 MG/G
1 PATCH TOPICAL ONCE
Status: DISCONTINUED | OUTPATIENT
Start: 2018-06-11 | End: 2018-06-11 | Stop reason: HOSPADM

## 2018-06-11 RX ORDER — LOPERAMIDE HYDROCHLORIDE 2 MG/1
2 CAPSULE ORAL 3 TIMES DAILY PRN
Qty: 30 CAPSULE | Refills: 0 | Status: SHIPPED | OUTPATIENT
Start: 2018-06-11 | End: 2018-06-11

## 2018-06-11 RX ADMIN — PROPOFOL 150 MG: 10 INJECTION, EMULSION INTRAVENOUS at 11:02

## 2018-06-11 RX ADMIN — PROPOFOL 30 MG: 10 INJECTION, EMULSION INTRAVENOUS at 11:04

## 2018-06-11 RX ADMIN — TOPIRAMATE 100 MG: 100 TABLET, FILM COATED ORAL at 08:47

## 2018-06-11 RX ADMIN — LIDOCAINE HYDROCHLORIDE 80 MG: 20 INJECTION, SOLUTION INTRAVENOUS at 11:02

## 2018-06-11 RX ADMIN — KETOROLAC TROMETHAMINE 15 MG: 30 INJECTION, SOLUTION INTRAMUSCULAR at 05:57

## 2018-06-11 RX ADMIN — DICYCLOMINE HYDROCHLORIDE 20 MG: 10 CAPSULE ORAL at 13:13

## 2018-06-11 RX ADMIN — BUSPIRONE HYDROCHLORIDE 30 MG: 10 TABLET ORAL at 08:46

## 2018-06-11 RX ADMIN — ESCITALOPRAM OXALATE 5 MG: 10 TABLET, FILM COATED ORAL at 08:46

## 2018-06-11 RX ADMIN — KETOROLAC TROMETHAMINE 15 MG: 30 INJECTION, SOLUTION INTRAMUSCULAR at 13:13

## 2018-06-11 RX ADMIN — PROPOFOL 40 MG: 10 INJECTION, EMULSION INTRAVENOUS at 11:06

## 2018-06-11 RX ADMIN — LIDOCAINE 1 PATCH: 50 PATCH TOPICAL at 13:17

## 2018-06-11 RX ADMIN — PROPOFOL 40 MG: 10 INJECTION, EMULSION INTRAVENOUS at 11:13

## 2018-06-11 RX ADMIN — ARIPIPRAZOLE 10 MG: 10 TABLET ORAL at 08:48

## 2018-06-11 RX ADMIN — CARBAMAZEPINE 200 MG: 200 TABLET ORAL at 08:46

## 2018-06-11 RX ADMIN — GABAPENTIN 600 MG: 300 CAPSULE ORAL at 08:46

## 2018-06-11 RX ADMIN — SODIUM CHLORIDE 75 ML/HR: 0.9 INJECTION, SOLUTION INTRAVENOUS at 05:58

## 2018-06-11 RX ADMIN — NICOTINE 1 PATCH: 21 PATCH, EXTENDED RELEASE TRANSDERMAL at 08:45

## 2018-06-11 RX ADMIN — PROPOFOL 40 MG: 10 INJECTION, EMULSION INTRAVENOUS at 11:10

## 2018-06-11 NOTE — OP NOTE
**** GI/ENDOSCOPY REPORT ****     PATIENT NAME: ANA MARIA TORRES ------ VISIT ID:     INTRODUCTION: Colonoscopy - A 56 female patient presents for an outpatient   Colonoscopy at [address] [place], [place] [address]  PREVIOUS COLONOSCOPY:     INDICATIONS: Diarrhea  Rectal bleeding  CONSENT:  The benefits, risks, and alternatives to the procedure were   discussed and informed consent was obtained from the patient  PREPARATION:  EKG, pulse, pulse oximetry and blood pressure were monitored   throughout the procedure  Patient was identified by myself and by the   armband  MEDICATIONS: Anesthesia-check records     PROCEDURE:  The endoscope was passed with ease under direct visualization   and advanced to the terminal ileum, confirmed by appendiceal orifice,   ileocecal valve, and landmarks  The scope was withdrawn and the mucosa was   carefully examined  The patient's toleration of the procedure was   excellent  The views were fair  The quality of the preparation was fair  Cecal Intubation Time: Minute(s) Scope Withdrawal Time: Minute(s)     RECTAL EXAM: Normal rectal exam  No fissures  No external hemorrhoids  FINDINGS:  There was evidence of mild diverticulosis in the ascending   colon and sigmoid colon  A single diminutive polyp was found in the   sigmoid colon  The polyp was completely removed by cold biopsy   polypectomy  The polyp was retrieved and placed in jar 2  Otherwise, the   colon appeared to be normal  There was no evidence of blood, erythematous   mucosae, or colitis in the colon  Two random cold forceps biopsies was   taken from the cecum, transverse colon, and sigmoid colon (jar 1)  The   terminal ileum was normal      COMPLICATIONS: There were no complications  IMPRESSIONS: Mild diverticulosis found in the ascending colon and sigmoid   colon  A single diminutive polyp found in the sigmoid colon; removed by   cold biopsy polypectomy   No evidence of blood, erythematous mucosae, and   colitis in the colon  The terminal ileum was normal      RECOMMENDATIONS: Call Dr Lawson Mercado [phone] with questions or   problems  Follow-up appointment with Dr Lawson Mercado in 4 weeks  Follow-up on the results of the biopsy specimens  Colonoscopy recommended   in 5 years  ESTIMATED BLOOD LOSS:     PROCEDURE CODES: Colonoscopy with biopsy     ICD-9 Codes: 787 91 Diarrhea 569 3 Hemorrhage of rectum and anus 562 10   Diverticulosis of colon (without mention of hemorrhage) 211 3 Benign   neoplasm of colon     ICD-10 Codes: R19 7 Diarrhea, unspecified K62 5 Hemorrhage of anus and   rectum K57 Diverticular disease of intestine K63 5 Polyp of colon     PERFORMED BY: ROSA Morales  on 06/11/2018  Version 1, electronically signed by ROSA Morales  on   06/11/2018 at 11:27

## 2018-06-11 NOTE — ANESTHESIA POSTPROCEDURE EVALUATION
Post-Op Assessment Note      CV Status:  Stable    Mental Status:  Alert and awake    Hydration Status:  Euvolemic    PONV Controlled:  Controlled    Airway Patency:  Patent and adequate    Post Op Vitals Reviewed: Yes          Staff: CRNA           BP   98/58   Temp      Pulse  71   Resp   15   SpO2   97%

## 2018-06-11 NOTE — DISCHARGE SUMMARY
[facility] Discharge Summary - Medical Татьяна Morales 56 y o  female MRN: [id number]    [address] [facility]  Room / Bed: [facility] 628/[facility] 231-25 Encounter: [id number]    BRIEF OVERVIEW    Admitting Provider: Ganesh Alegre MD  Discharge Provider: Avelina Gaytan MD  Primary Care Physician at Discharge: Camilla Diaz, [address] [place]  Admission Date: 6/9/2018     Discharge Date: 6/11/2018  3:39 PM    Hospital Course  Ms Blayne Diaz is a 56year old lady with a past medical history significant for bipolar disorder, nicotine dependence, history of ovarian cancer, alcohol abuse presented for evaluation of chronic lower abdominal pain, diarrhea, and bright red blood per rectum  The diarrhea and lower abdominal pain is chronic for 2 to 3 months that is improved with bowel movements  Patient remained hemodynamically stable, hemoglobin was 13 that down trended to 11 which was partially dilutional, and CMP revealed no significant abnormalities  GI was consulted and patient underwent a bowel prep for colonoscopy  Colonoscopy was performed on 6/11/2018 that revealed diverticulosis, a polyp that was resected, and no evidence of colitis  CT scan was performed that did not reveal any acute signs of colitis or diverticulitis however hepatic cysts were noted on this scan  C diff PCR was negative  Patient did not have further episodes of bright red blood per rectum during her hospitalization  After colonoscopy patient felt comfortable to be discharged home with close outpatient follow-up in four weeks with Gastroenterology  Patient to follow up with her PCP within one week  Discharge patient on Bentyl 20 mg 4 times daily with instructions to increase the dose to 40 mg 4 times daily after one week  Discussed with patient if patient does not have relief after two weeks to discontinue    Patient also started on loperamide as patient's diarrhea does not seem to be infectious in etiology secondary to chronic nature of diarrhea     Extensively counseled the patient on smoking cessation  Presenting Problem/History of Present Illness:  Patient was admitted for bright red blood per rectum and chronic diarrhea/abdominal pain  Principal Problem:    Bright red blood per rectum  Active Problems:    Chronic diarrhea    Tobacco abuse    Normal anion gap metabolic acidosis    Acute kidney injury ([address] )    Hepatic cyst  Resolved Problems:    * No resolved hospital problems  *        Diagnostic Procedures Performed  Imaging Studies:  Ct Abdomen Pelvis With Contrast    Result Date: 6/9/2018  Impression: Interval development of several hypodensities within the liver, most of which are less than 1 cm in size  There is a larger hypodensity within the inferior aspect of the liver below the gallbladder fossa measuring 2 1 cm in diameter  These may represent hepatic cysts  Follow-up hepatic ultrasound recommended to confirm simple cysts  This can be performed on an outpatient basis  Passive atelectatic change within the lung bases posteriorly  The study was marked in EPIC for significant notification   Workstation performed: [id number]     Pertinent Labs:     Results from last 7 days  Lab Units 06/11/18  0530 06/10/18  0535 06/09/18  1747   SODIUM mmol/L 142 139 140   POTASSIUM mmol/L 3 7 3 8 3 9   CHLORIDE mmol/L 113* 111* 110*   CO2 mmol/L 20* 23 24   BUN mg/dL 7 12 9   CREATININE mg/dL 0 56* 0 71 0 98   CALCIUM mg/dL 8 2* 8 1* 8 7   TOTAL PROTEIN g/dL  --   --  7 0   BILIRUBIN TOTAL mg/dL  --   --  0 24   ALK PHOS U/L  --   --  112   ALT U/L  --   --  27   AST U/L  --   --  12   GLUCOSE RANDOM mg/dL 90 88 98        Therapeutic Procedures Performed  None    Test Results Pending at Discharge:   Fecal Calprotectin, Fecal leukocytes, Fecal fat, IgA Tissue Transglutaminase, Stool sodium  Tissue biopsy from colonoscopy pending     Medications     Medication List to be Continued at Discharge  Discharge Medication List as of 6/11/2018  3:16 PM      CONTINUE these medications which have NOT CHANGED    Details   ARIPiprazole (ABILIFY) 10 mg tablet Take 10 mg by mouth daily, Historical Med      busPIRone (BUSPAR) 30 MG tablet Take 30 mg by mouth 2 (two) times a day, Historical Med      carBAMazepine (TEGretol) 200 mg tablet Take 200 mg by mouth 2 (two) times a day, Historical Med      !! escitalopram (LEXAPRO) 10 mg tablet Take 5 mg by mouth daily at bedtime  , Historical Med      !! escitalopram (LEXAPRO) 5 mg tablet Take 5 mg by mouth daily AM , Historical Med      gabapentin (NEURONTIN) 600 MG tablet Take 600 mg by mouth 3 (three) times a day, Historical Med      hydrOXYzine HCL (ATARAX) 50 mg tablet Take 50 mg by mouth 4 (four) times a day as needed for itching, Historical Med      melatonin 3 mg Take 3 mg by mouth daily at bedtime, Historical Med      !! propranolol (INDERAL) 20 mg tablet Take 20 mg by mouth daily AM, Historical Med      !! propranolol (INDERAL) 40 mg tablet Take 40 mg by mouth daily at bedtime, Historical Med      SUMAtriptan (IMITREX) 100 mg tablet Take 100 mg by mouth once as needed for migraine, Historical Med      topiramate (TOPAMAX) 100 mg tablet Take 100 mg by mouth 2 (two) times a day, Historical Med       !! - Potential duplicate medications found  Please discuss with provider          Discharge Medication List as of 6/11/2018  3:16 PM      START taking these medications    Details   acetaminophen (TYLENOL) 325 mg tablet Take 2 tablets (650 mg total) by mouth every 6 (six) hours as needed for mild pain, headaches or fever for up to 10 days, Starting Mon 6/11/2018, Until Thu 6/21/2018, Print      dicyclomine (BENTYL) 20 mg tablet Take 1 tablet (20 mg total) by mouth every 6 (six) hours for 30 days, Starting Mon 6/11/2018, Until Wed 7/11/2018, Print      nicotine (NICODERM CQ) 21 mg/24 hr TD 24 hr patch Place 1 patch on the skin daily, Starting Tue 6/12/2018, Print           Discharge Medication List as of 6/11/2018  3:16 PM      STOP taking these medications       Aspirin-Acetaminophen-Caffeine (EXCEDRIN PO) Comments:   Reason for Stopping:         cyclobenzaprine (FLEXERIL) 10 mg tablet Comments:   Reason for Stopping:         ibuprofen (MOTRIN) 600 mg tablet Comments:   Reason for Stopping:               Allergies  Allergies   Allergen Reactions    Latex     Other      Adhesive tape, Natural rubber     Discharge Diet: regular diet  Activity restrictions: none  Discharge Condition: good  Discharged With Lines: no    Discharge Disposition: Home/Self Care    Outpatient Follow-Up  1  Follow up with Gastroenterology Dr Gualberto Arana in 4 weeks   2  Follow up with PCP Lidia Brooke in one week      Code Status: Level 1 - Full Code    Discharge  Statement   I spent 30 minutes minutes discharging the patient  This time was spent on the day of discharge  I had direct contact with the patient on the day of discharge

## 2018-06-11 NOTE — ANESTHESIA PREPROCEDURE EVALUATION
Review of Systems/Medical History    Chart reviewed  No history of anesthetic complications     Cardiovascular  Negative cardio ROS Exercise tolerance (METS): >4,     Pulmonary  Smoker cigarette smoker  ,   Comment: 1 PPD x30+ years     GI/Hepatic      Comment: Diarrhea, abdominal pain and blood per rectum     Negative  ROS        Endo/Other  Negative endo/other ROS      GYN    Hysterectomy, Ovarian cancer,        Hematology   Musculoskeletal  Negative musculoskeletal ROS        Neurology  Negative neurology ROS      Psychology   Depression , bipolar disorder,              Physical Exam    Airway  Comment: Redundant tissue  Mallampati score: III  TM Distance: >3 FB  Neck ROM: full     Dental   Comment: edentulous,     Cardiovascular  Comment: Negative ROS,     Pulmonary      Other Findings        Anesthesia Plan  ASA Score- 2     Anesthesia Type- IV sedation with anesthesia with ASA Monitors  Additional Monitors:   Airway Plan:     Comment: GA backup  Plan Factors-    Induction- intravenous  Postoperative Plan-     Informed Consent- Anesthetic plan and risks discussed with patient  I personally reviewed this patient with the CRNA  Discussed and agreed on the Anesthesia Plan with the CRNA  Kristi Diaz

## 2018-06-11 NOTE — SOCIAL WORK
CM met with patient, independent lives in a house/ Recovery facility called [facility]  CM spoke with Sandie Kelly at facility [phone] on [facility] in [place] no request for additional information  No dme's  Fills prescription at  [facility]    No advance directives  Admits to ETOH abuse in the pasdt 8 months clean, admits to mental health dx of depression and bi-polar disorder  Friend will  patient or she will take a lift  pending discharge readiness  CM reviewed d/c planning process including the following: identifying help at home, patient preference for d/c planning needs, Discharge Lounge, [facility] Meds to Bed program, availability of treatment team to discuss questions or concerns patient and/or family may have regarding understanding medications and recognizing signs and symptoms once discharged  CM also encouraged patient to follow up with all recommended appointments after discharge  Patient advised of importance for patient and family to participate in managing patients medical well being  Patient/caregiver received discharge checklist  Content reviewed  Patient/caregiver encouraged to participate in discharge plan of care prior to discharge home

## 2018-06-11 NOTE — PROGRESS NOTES
IM Residency Progress Note   Unit/Bed#: [facility] 628-01   Encounter: [id number]  SOD Team A    Татьяна Morales 56 y o  female   MRN: [id number]  Hospital Stay Days: 0    Assessment/Plan:  Principal Problem:    Bright red blood per rectum  Active Problems:    Chronic diarrhea    Tobacco abuse    Normal anion gap metabolic acidosis    Acute kidney injury ([facility] [address] )    Chronic Diarrhea   Patient had colonoscopy today that revealed mild diverticulosis in the ascending colon colon  A single polyp found in the sigmoid colon removed  No evidence of colitis  Biopsies were taken  Patient has follow-up appointment with Dr Lawson Mercado in four weeks  IV fluids at 75 cc/hr normal saline  Stool enteric bacterial panel by PCR pending   Stool osmolality pending   Caprotectin pending   Fecal leukocytes and fecal fat pending  Tissue transglutaminase IgA pending   CRP 6 6; ESR normal   Resume diet  Will discontinue fluids     Abdominal cramping   Continue Bentyl 20 mg 4 times daily   Patient getting Toradol  Would not escalate at this time        Hematochezia   No further episodes since arrival to hospital   Hemoglobin stable at 11   Colonoscopy revealed diverticulosis with no signs of colitis   Biopsies taken      Bipolar Disease  Continue Abilify, Buspar, Carbamazepine, Lexapro   Stable at this time      Hepatic Cysts on CT   Will need outpatient hepatic ultrasound for follow up   Possible hepatic cysts per CT -  Follow-up hepatic ultrasound recommended to confirm simple cysts as an outpatient    History of Ovarian Cancer   Status post hysterectomy and bilateral oopherectomy at age 29     NAGMA   Secondary to diarrhea  Diarrhea improving     MELIDA   Likely pre-renal secondary to diarrhea   Improving with fluid hydration     Nicotine Dependence  Nicotine patch   Counseled on smoking cessation     Disposition: Colonoscopy revealed diverticulosis with no further episodes of bright red blood per rectum  Discharge later today  Subjective:   Patient seen and examined  No acute events overnight  Patient reports she has had multiple episodes of loose stool secondary to bowel prep  She denies any further heart red blood per rectum or black stools  Patient denies nausea, vomiting, chest pain, shortness of breath, palpitations, headache  Patient complains of diffuse lower abdominal pain that is cramping in nature  Patient also complaining of left flank pain with no CVA tenderness  Vitals: Temp (24hrs), Av °F (37 2 °C), Min:98 6 °F (37 °C), Max:99 9 °F (37 7 °C)   Temperature: 99 9 °F (37 7 °C)  Vitals:    18 0900 18 1021 18 1125 18 1138   BP: 124/76 139/78 98/56 104/59   BP Location: Left leg      Pulse: 70 69 76 66   Resp:    Temp: 98 6 °F (37 °C) 99 9 °F (37 7 °C)     TempSrc: Oral Temporal     SpO2: 98% 98% 97% 97%   Weight:       Height:          Body mass index is 33 73 kg/m²  I/O last 24 hours: In: 3145 [P O :770; I V :2375]  Out: 2400 [Urine:1600; Stool:800]    Physical Exam   Constitutional: She is oriented to person, place, and time  Vital signs are normal  She appears well-developed and well-nourished  No distress  Obese   HENT:   Head: Normocephalic and atraumatic  Mouth/Throat: No oropharyngeal exudate  Eyes: Conjunctivae are normal  Pupils are equal, round, and reactive to light  No scleral icterus  Neck: Normal range of motion  Cardiovascular: Normal rate, regular rhythm, normal heart sounds and intact distal pulses  Exam reveals no gallop and no friction rub  No murmur heard  Pulmonary/Chest: Effort normal and breath sounds normal  No respiratory distress  She has no wheezes  She exhibits no tenderness  Abdominal: Soft  Bowel sounds are normal  She exhibits no distension and no mass  There is tenderness (Lower abdomen )  There is no rebound and no guarding  Musculoskeletal: Normal range of motion  She exhibits no edema or tenderness     Lymphadenopathy: She has no cervical adenopathy  Neurological: She is alert and oriented to person, place, and time  No cranial nerve deficit  Skin: Skin is warm and dry  She is not diaphoretic  No erythema  Psychiatric: She has a normal mood and affect  Her behavior is normal    Nursing note and vitals reviewed  Invasive Devices     Peripheral Intravenous Line            Peripheral IV 06/09/18 Left Antecubital 1 day                Labs: I have personally reviewed pertinent reports  Recent Results (from the past 24 hour(s))   Clostridium difficile toxin by PCR    Collection Time: 06/10/18  7:56 PM   Result Value Ref Range    C difficile toxin by PCR NEGATIVE for C difficle toxin by PCR  NEGATIVE for C difficle toxin by PCR      Sedimentation rate, automated    Collection Time: 06/11/18  5:30 AM   Result Value Ref Range    Sed Rate 12 0 - 20 mm/hour   TSH, 3rd generation    Collection Time: 06/11/18  5:30 AM   Result Value Ref Range    TSH 3RD GENERATON 0 468 0 358 - 3 740 uIU/mL   Basic metabolic panel    Collection Time: 06/11/18  5:30 AM   Result Value Ref Range    Sodium 142 136 - 145 mmol/L    Potassium 3 7 3 5 - 5 3 mmol/L    Chloride 113 (H) 100 - 108 mmol/L    CO2 20 (L) 21 - 32 mmol/L    Anion Gap 9 4 - 13 mmol/L    BUN 7 5 - 25 mg/dL    Creatinine 0 56 (L) 0 60 - 1 30 mg/dL    Glucose 90 65 - 140 mg/dL    Calcium 8 2 (L) 8 3 - 10 1 mg/dL    eGFR 105 ml/min/1 73sq m   CBC and differential    Collection Time: 06/11/18  5:30 AM   Result Value Ref Range    WBC 4 27 (L) 4 31 - 10 16 Thousand/uL    RBC 3 88 3 81 - 5 12 Million/uL    Hemoglobin 11 4 (L) 11 5 - 15 4 g/dL    Hematocrit 34 7 (L) 34 8 - 46 1 %    MCV 89 82 - 98 fL    MCH 29 4 26 8 - 34 3 pg    MCHC 32 9 31 4 - 37 4 g/dL    RDW 13 6 11 6 - 15 1 %    MPV 8 9 8 9 - 12 7 fL    Platelets 612 258 - 169 Thousands/uL    nRBC 0 /100 WBCs    Neutrophils Relative 45 43 - 75 %    Immat GRANS % 0 0 - 2 %    Lymphocytes Relative 43 14 - 44 %    Monocytes Relative 8 4 - 12 %    Eosinophils Relative 3 0 - 6 %    Basophils Relative 1 0 - 1 %    Neutrophils Absolute 1 94 1 85 - 7 62 Thousands/µL    Immature Grans Absolute 0 00 0 00 - 0 20 Thousand/uL    Lymphocytes Absolute 1 82 0 60 - 4 47 Thousands/µL    Monocytes Absolute 0 35 0 17 - 1 22 Thousand/µL    Eosinophils Absolute 0 13 0 00 - 0 61 Thousand/µL    Basophils Absolute 0 03 0 00 - 0 10 Thousands/µL     Radiology Results: I have personally reviewed pertinent reports  Ct Abdomen Pelvis With Contrast    Result Date: 6/9/2018  Impression: Interval development of several hypodensities within the liver, most of which are less than 1 cm in size  There is a larger hypodensity within the inferior aspect of the liver below the gallbladder fossa measuring 2 1 cm in diameter  These may represent hepatic cysts  Follow-up hepatic ultrasound recommended to confirm simple cysts  This can be performed on an outpatient basis  Passive atelectatic change within the lung bases posteriorly  The study was marked in EPIC for significant notification  Workstation performed: [id number]     Other Diagnostic Testing: I have personally reviewed pertinent reports        Active Meds:   Current Facility-Administered Medications   Medication Dose Route Frequency    acetaminophen (TYLENOL) tablet 650 mg  650 mg Oral Q6H PRN    ARIPiprazole (ABILIFY) tablet 10 mg  10 mg Oral Daily    busPIRone (BUSPAR) tablet 30 mg  30 mg Oral BID    carBAMazepine (TEGretol) tablet 200 mg  200 mg Oral BID    dicyclomine (BENTYL) capsule 20 mg  20 mg Oral 4x Daily (AC & HS)    escitalopram (LEXAPRO) tablet 5 mg  5 mg Oral HS    escitalopram (LEXAPRO) tablet 5 mg  5 mg Oral Daily    gabapentin (NEURONTIN) capsule 600 mg  600 mg Oral TID    hydrALAZINE (APRESOLINE) injection 5 mg  5 mg Intravenous Q4H PRN    ketorolac (TORADOL) injection 15 mg  15 mg Intravenous Q6H PRN    melatonin tablet 3 mg  3 mg Oral HS    nicotine (NICODERM CQ) 21 mg/24 hr TD 24 hr patch 1 patch  1 patch Transdermal Daily    sodium chloride 0 9 % infusion  75 mL/hr Intravenous Continuous    topiramate (TOPAMAX) tablet 100 mg  100 mg Oral BID       VTE Pharmacologic Prophylaxis: Reason for no pharmacologic prophylaxis GI bleed  VTE Mechanical Prophylaxis: sequential compression device    ROSA Mcdonald     [facility]  Internal Medicine Resident PGY-1

## 2018-06-11 NOTE — CASE MANAGEMENT
Continued Stay Review    Thank you,  [address] [facility] in the [facility] by [employer] for 2017  Network Utilization Review Department  Phone: [phone]; Fax [phone]  ATTENTION: The Network Utilization Review Department is now centralized for our 7 Facilities  Make a note that we have a new phone and fax numbers for our Department  Please call with any questions or concerns to [phone] and carefully follow the prompts so that you are directed to the right person  All voicemails are confidential  Fax any determinations, approvals, denials, and requests for initial or continue stay review clinical to [phone]  Due to HIGH CALL volume, it would be easier if you could please send faxed requests to expedite your requests and in part, help us provide discharge notifications faster  Date: 6/11/2018    Vital Signs: /86 (BP Location: Left arm)   Pulse 67   Temp 98 6 °F (37 °C) (Oral)   Resp 22   Ht 5' 9" (1 753 m)   Wt 104 kg (228 lb 6 3 oz)   LMP  (Approximate)   SpO2 96%   Breastfeeding?  No   BMI 33 73 kg/m²     Medications:   Scheduled Meds:   Current Facility-Administered Medications:  acetaminophen 650 mg Oral Q6H PRN 6/10 x 1   ARIPiprazole 10 mg Oral Daily    busPIRone 30 mg Oral BID    carBAMazepine 200 mg Oral BID    Dicyclomine ( Bentyl ) 20 mg Oral 4x Daily (AC & HS)    escitalopram 5 mg Oral HS    escitalopram 5 mg Oral Daily    gabapentin 600 mg Oral TID    hydrALAZINE 5 mg Intravenous Q4H PRN    ketorolac 15 mg Intravenous Q6H PRN 6/9 x 1  6/10 x 5  6/11 x 1    melatonin 3 mg Oral HS    nicotine 1 patch Transdermal Daily    topiramate 100 mg Oral BID      Continuous Infusions:   sodium chloride 75 mL/hr Last Rate: 75 mL/hr (06/11/18 0558)       Abnormal Labs/Diagnostic Results:      Ref Range & Units 6/11/18 0530 6/10/18 0535 6/9/18 1747   WBC 4 31 - 10 16 Thousand/uL 4 27   5 94  6 08    RBC 3 81 - 5 12 Million/uL 3 88  3 92 4 38    Hemoglobin 11 5 - 15 4 g/dL 11 4   11 5  13 2    Hematocrit 34 8 - 46 1 % 34 7   35 7  39 2    MCV 82 - 98 fL 89  91  90    MCH 26 8 - 34 3 pg 29 4  29 3  30 1    MCHC 31 4 - 37 4 g/dL 32 9  32 2  33 7    RDW 11 6 - 15 1 % 13 6  13 7  13 6    MPV 8 9 - 12 7 fL 8 9  9 2  8 9    Platelets 238 - 375 Thousands/uL 241  247  320    nRBC /100 WBCs 0  0  0    Neutrophils Relative 43 - 75 % 45  45  35     Immat GRANS % 0 - 2 % 0  0  0    Lymphocytes Relative 14 - 44 % 43  42  50     Monocytes Relative 4 - 12 % 8  9  10    Eosinophils Relative 0 - 6 % 3  3  4    Basophils Relative 0 - 1 % 1  1  1    Neutrophils Absolute 1 85 - 7 62 Thousands/µL 1 94  2 67  2 11    Immature Grans Absolute 0 00 - 0 20 Thousand/uL 0 00  0 02  0 01    Lymphocytes Absolute 0 60 - 4 47 Thousands/µL 1 82  2 50  3 04    Monocytes Absolute 0 17 - 1 22 Thousand/µL 0 35  0 55  0 63    Eosinophils Absolute 0 00 - 0 61 Thousand/µL 0 13  0 17  0 25    Basophils Absolute 0 00 - 0 10 Thousands/µL 0 03  0 03  0 04             Ref Range & Units 6/11/18 0530 6/10/18 0535 6/9/18 1747   Sodium 136 - 145 mmol/L 142  139  140    Potassium 3 5 - 5 3 mmol/L 3 7  3 8  3 9    Chloride 100 - 108 mmol/L 113   111   110     CO2 21 - 32 mmol/L 20   23  24    Anion Gap 4 - 13 mmol/L 9  5  6    BUN 5 - 25 mg/dL 7  12  9    Creatinine 0 60 - 1 30 mg/dL 0 56   0 71CM  0 98CM    Glucose 65 - 140 mg/dL 90  88CM  98CM    Calcium 8 3 - 10 1 mg/dL 8 2   8 1   8 7    eGFR ml/min/1 73sq m 105  96  65                  Age/Sex: 56 y o  female     Assessment/Plan:   Chronic Diarrhea  X 2- 3 mths  -  lower abd cramping - GI consult - Colonoscopy on 6/11  Hematochezia  Progressively worsening  Possible hepatic cysts per CT- US follow up      Discharge Plan:  TBD

## 2018-06-11 NOTE — DISCHARGE INSTRUCTIONS
Take Dicylomine- 20 mg tablet four times daily for 7 days then take 40 mg tablets four times a day  If no improvement may discontinue     STOP SMOKING    Chronic Diarrhea   WHAT YOU NEED TO KNOW:   Diarrhea is chronic when it lasts more than 4 weeks  You may have 3 or more episodes of diarrhea each day  DISCHARGE INSTRUCTIONS:   Seek care immediately if:   · Your skin, mouth, and tongue are dry, and you feel very thirsty  · You have blood or pus in your bowel movement  · You have trouble eating, drinking, or keeping food down  · You have severe abdominal pain  · You feel lightheaded, weak, or you faint  · Your heart beats faster than normal or you have trouble breathing  · You are confused or cannot think clearly  Contact your healthcare provider if:   · You have a fever  · You have new symptoms  · Your symptoms do not improve, or they get worse  · You have questions or concerns about your condition or care  Medicines:   · You may be given medicine to slow your diarrhea, or treat an infection  You may also be given medicines to decrease inflammation in your intestines  · Take your medicine as directed  Contact your healthcare provider if you think your medicine is not helping or if you have side effects  Tell him or her if you are allergic to any medicine  Keep a list of the medicines, vitamins, and herbs you take  Include the amounts, and when and why you take them  Bring the list or the pill bottles to follow-up visits  Carry your medicine list with you in case of an emergency  Follow up with your healthcare provider as directed:  Write down your questions so you remember to ask them during your visits  Self-care:   · Drink more liquids  to replace body fluids lost through diarrhea  You may also need to drink an oral rehydration solution (ORS)  An ORS has the right amounts of sugar, salt, and minerals in water to replace body fluids   ORS can be found at most grocery stores or pharmacies  Ask how much liquid to drink each day and which liquids are best for you  Do not drink liquids with caffeine or alcohol  These can increase your risk for dehydration  · Do not drink or eat foods that may make your symptoms worse  These include milk and dairy products, greasy and fatty foods, spicy foods, caffeine, and alcohol  Keep a food diary to see if your symptoms are caused by certain foods  Bring this to your follow-up visits  · Eat foods that are easy to digest   These include bananas, boiled potatoes, cooked carrots, cooked chicken, plain rice, and toast  You can also try yogurt and applesauce  · Wash your hands often  Germs on your hands can get into your mouth and cause diarrhea  Use soap and water  Use an alcohol-based hand rub if soap and water are not available  Wash your hands after you use the bathroom, change a child's diaper, or sneeze  Wash your hands before you prepare or eat food  © 2017 [employer] [employer] Information is for End User's use only and may not be sold, redistributed or otherwise used for commercial purposes  All illustrations and images included in CareNotes® are the copyrighted property of A [employer]  or [employer]  The above information is an  only  It is not intended as medical advice for individual conditions or treatments  Talk to your doctor, nurse or pharmacist before following any medical regimen to see if it is safe and effective for you

## 2018-06-11 NOTE — PLAN OF CARE
INFECTION - ADULT     Absence of fever/infection during neutropenic period Completed        SAFETY ADULT     Maintain or return to baseline ADL function Completed          INFECTION - ADULT     Absence or prevention of progression during hospitalization Progressing        Knowledge Deficit     Patient/family/caregiver demonstrates understanding of disease process, treatment plan, medications, and discharge instructions Progressing        PAIN - ADULT     Verbalizes/displays adequate comfort level or baseline comfort level Progressing        SAFETY ADULT     Patient will remain free of falls Progressing     Maintain or return mobility status to optimal level Progressing

## 2018-06-11 NOTE — PROGRESS NOTES
Spoke with SOD resident (Kathy Daley), there are stool samples to be collected   We are now not to collect the osmolarity sample or the fecal fat samples

## 2018-06-11 NOTE — PLAN OF CARE
GASTROINTESTINAL - ADULT     Minimal or absence of nausea and/or vomiting Progressing     Maintains or returns to baseline bowel function Progressing        INFECTION - ADULT     Absence or prevention of progression during hospitalization Progressing        Knowledge Deficit     Patient/family/caregiver demonstrates understanding of disease process, treatment plan, medications, and discharge instructions Progressing        METABOLIC, FLUID AND ELECTROLYTES - ADULT     Electrolytes maintained within normal limits Progressing        PAIN - ADULT     Verbalizes/displays adequate comfort level or baseline comfort level Progressing        SAFETY ADULT     Patient will remain free of falls Progressing     Maintain or return mobility status to optimal level Progressing

## 2018-06-14 LAB — SODIUM STL-SCNC: 70 MMOL/L

## 2018-06-15 LAB — CALPROTECTIN STL-MCNT: <16 UG/G (ref 0–120)

## 2018-07-06 ENCOUNTER — APPOINTMENT (EMERGENCY)
Dept: NON INVASIVE DIAGNOSTICS | Facility: HOSPITAL | Age: 58
End: 2018-07-06
Payer: COMMERCIAL

## 2018-07-06 ENCOUNTER — HOSPITAL ENCOUNTER (EMERGENCY)
Facility: HOSPITAL | Age: 58
Discharge: HOME | End: 2018-07-06
Attending: EMERGENCY MEDICINE | Admitting: EMERGENCY MEDICINE
Payer: COMMERCIAL

## 2018-07-06 VITALS
TEMPERATURE: 98.4 F | SYSTOLIC BLOOD PRESSURE: 128 MMHG | HEART RATE: 72 BPM | OXYGEN SATURATION: 93 % | WEIGHT: 230 LBS | DIASTOLIC BLOOD PRESSURE: 64 MMHG | RESPIRATION RATE: 18 BRPM | BODY MASS INDEX: 33.97 KG/M2

## 2018-07-06 DIAGNOSIS — M79.605 LEFT LEG PAIN: ICD-10-CM

## 2018-07-06 DIAGNOSIS — M79.89 LEG SWELLING: Primary | ICD-10-CM

## 2018-07-06 LAB
ALBUMIN SERPL BCP-MCNC: 3.5 G/DL (ref 3.5–5)
ALP SERPL-CCNC: 101 U/L (ref 46–116)
ALT SERPL W P-5'-P-CCNC: 20 U/L (ref 12–78)
ANION GAP SERPL CALCULATED.3IONS-SCNC: 6 MMOL/L (ref 4–13)
AST SERPL W P-5'-P-CCNC: 15 U/L (ref 5–45)
BILIRUB SERPL-MCNC: 0.23 MG/DL (ref 0.2–1)
BUN SERPL-MCNC: 6 MG/DL (ref 5–25)
CALCIUM SERPL-MCNC: 8.4 MG/DL (ref 8.3–10.1)
CHLORIDE SERPL-SCNC: 108 MMOL/L (ref 100–108)
CO2 SERPL-SCNC: 25 MMOL/L (ref 21–32)
CREAT SERPL-MCNC: 0.71 MG/DL (ref 0.6–1.3)
ERYTHROCYTE [DISTWIDTH] IN BLOOD BY AUTOMATED COUNT: 14.5 % (ref 11.6–15.1)
GFR SERPL CREATININE-BSD FRML MDRD: 96 ML/MIN/1.73SQ M
GLUCOSE SERPL-MCNC: 101 MG/DL (ref 65–140)
HCT VFR BLD AUTO: 36.8 % (ref 34.8–46.1)
HGB BLD-MCNC: 11.8 G/DL (ref 11.5–15.4)
MCH RBC QN AUTO: 29.4 PG (ref 26.8–34.3)
MCHC RBC AUTO-ENTMCNC: 32.1 G/DL (ref 31.4–37.4)
MCV RBC AUTO: 92 FL (ref 82–98)
PLATELET # BLD AUTO: 287 THOUSANDS/UL (ref 149–390)
PMV BLD AUTO: 9.4 FL (ref 8.9–12.7)
POTASSIUM SERPL-SCNC: 3.7 MMOL/L (ref 3.5–5.3)
PROT SERPL-MCNC: 7.2 G/DL (ref 6.4–8.2)
RBC # BLD AUTO: 4.02 MILLION/UL (ref 3.81–5.12)
SODIUM SERPL-SCNC: 139 MMOL/L (ref 136–145)
WBC # BLD AUTO: 7.76 THOUSAND/UL (ref 4.31–10.16)

## 2018-07-06 PROCEDURE — 93970 EXTREMITY STUDY: CPT

## 2018-07-06 PROCEDURE — 96376 TX/PRO/DX INJ SAME DRUG ADON: CPT

## 2018-07-06 PROCEDURE — 99284 EMERGENCY DEPT VISIT MOD MDM: CPT

## 2018-07-06 PROCEDURE — 80053 COMPREHEN METABOLIC PANEL: CPT | Performed by: EMERGENCY MEDICINE

## 2018-07-06 PROCEDURE — 96374 THER/PROPH/DIAG INJ IV PUSH: CPT

## 2018-07-06 PROCEDURE — 85027 COMPLETE CBC AUTOMATED: CPT | Performed by: EMERGENCY MEDICINE

## 2018-07-06 PROCEDURE — 36415 COLL VENOUS BLD VENIPUNCTURE: CPT | Performed by: EMERGENCY MEDICINE

## 2018-07-06 RX ORDER — ACETAMINOPHEN 325 MG/1
975 TABLET ORAL ONCE
Status: COMPLETED | OUTPATIENT
Start: 2018-07-06 | End: 2018-07-06

## 2018-07-06 RX ORDER — IBUPROFEN 600 MG/1
600 TABLET ORAL EVERY 6 HOURS PRN
Qty: 30 TABLET | Refills: 0 | Status: SHIPPED | OUTPATIENT
Start: 2018-07-06 | End: 2020-01-04 | Stop reason: ALTCHOICE

## 2018-07-06 RX ADMIN — HYDROMORPHONE HYDROCHLORIDE 1 MG: 1 INJECTION, SOLUTION INTRAMUSCULAR; INTRAVENOUS; SUBCUTANEOUS at 18:42

## 2018-07-06 RX ADMIN — ACETAMINOPHEN 975 MG: 325 TABLET, FILM COATED ORAL at 19:42

## 2018-07-06 RX ADMIN — HYDROMORPHONE HYDROCHLORIDE 0.5 MG: 1 INJECTION, SOLUTION INTRAMUSCULAR; INTRAVENOUS; SUBCUTANEOUS at 20:42

## 2018-07-06 NOTE — ED ATTENDING ATTESTATION
Marjorie Kayser, MD, saw and evaluated the patient  I have discussed the patient with the resident/non-physician practitioner and agree with the resident's/non-physician practitioner's findings, Plan of Care, and MDM as documented in the resident's/non-physician practitioner's note, except where noted  All available labs and Radiology studies were reviewed  At this point I agree with the current assessment done in the Emergency Department  I have conducted an independent evaluation of this patient a history and physical is as follows:      Critical Care Time  CritCare Time    Procedures     57 yo female with bilateral leg swelling, worse on left with swelling and pain since last week  Pt with hx of same one year ago and had negative studies for dvt  No fever, no abdominal pain or swelling, no cp, no sob   pmh ovarian ca, bipolar, depression  Vss, afebrile, lungs cta, rrr, abdomen soft nontender, left foot swelling and erythema, tenderness, left calf tenderness bilateral duplex  Labs to rule out kidney, liver issues, no concern for chf   Likely dependent edema

## 2018-07-07 PROCEDURE — 93970 EXTREMITY STUDY: CPT | Performed by: SURGERY

## 2018-07-07 NOTE — DISCHARGE INSTRUCTIONS
Follow up with primary care doctori n 3-5 days  If your symptoms worsen, return to the ED immediately  Leg Pain   WHAT YOU NEED TO KNOW:   Leg pain may be caused by a variety of health conditions  Your tests did not show any broken bones or blood clots  DISCHARGE INSTRUCTIONS:   Return to the emergency department if:   · You have a fever  · Your leg starts to swell  · Your leg pain gets worse  · You have numbness or tingling in your leg or toes  · You cannot put any weight on or move your leg  Contact your healthcare provider if:   · Your pain does not decrease, even after treatment  · You have questions or concerns about your condition or care  Medicines:   · NSAIDs , such as ibuprofen, help decrease swelling, pain, and fever  This medicine is available with or without a doctor's order  NSAIDs can cause stomach bleeding or kidney problems in certain people  If you take blood thinner medicine, always ask your healthcare provider if NSAIDs are safe for you  Always read the medicine label and follow directions  · Take your medicine as directed  Contact your healthcare provider if you think your medicine is not helping or if you have side effects  Tell him of her if you are allergic to any medicine  Keep a list of the medicines, vitamins, and herbs you take  Include the amounts, and when and why you take them  Bring the list or the pill bottles to follow-up visits  Carry your medicine list with you in case of an emergency  Follow up with your healthcare provider as directed: You may need more tests to find the cause of your leg pain  You may need to see an orthopedic specialist or a physical therapist  Write down your questions so you remember to ask them during your visits  Manage your leg pain:   · Rest  your injured leg so that it can heal  You may need an immobilizer, brace, or splint to limit the movement of your leg   You may need to avoid putting any weight on your leg for at least 48 hours  Return to normal activities as directed  · Ice  the injury for 20 minutes every 4 hours for up to 24 hours, or as directed  Use an ice pack, or put crushed ice in a plastic bag  Cover it with a towel to protect your skin  Ice helps prevent tissue damage and decreases swelling and pain  · Elevate  your injured leg above the level of your heart as often as you can  This will help decrease swelling and pain  If possible, prop your leg on pillows or blankets to keep the area elevated comfortably  · Use assistive devices as directed  You may need to use a cane or crutches  Assistive devices help decrease pain and pressure on your leg when you walk  Ask your healthcare provider for more information about assistive devices and how to use them correctly  · Maintain a healthy weight  Extra body weight can cause pressure and pain in your hip, knee, and ankle joints  Ask your healthcare provider how much you should weigh  Ask him to help you create a weight loss plan if you are overweight  © 2017 [facility] [facility] Information is for End User's use only and may not be sold, redistributed or otherwise used for commercial purposes  All illustrations and images included in CareNotes® are the copyrighted property of [employer] M [employer]  or [employer] Barker  The above information is an  only  It is not intended as medical advice for individual conditions or treatments  Talk to your doctor, nurse or pharmacist before following any medical regimen to see if it is safe and effective for you

## 2018-07-07 NOTE — ED PROVIDER NOTES
History  Chief Complaint   Patient presents with    Leg Swelling     Patient states that over the past week legs b/l have started to swell  The L more than the right  Patient states "I was sitting there today and they just started swelling up so bad so I decided to come in"  Patient states that this happened once before about a year ago  82-year-old female presents to the emergency department for evaluation of bilateral leg swelling  She states that her left lower extremities are to have swelling on Sunday and then she starts noticed left lower extremity pain on Wednesday which she localized around her left ankle and lower calf  Of note, patient did have a DVT study done a year and half ago which was negative for DVT  Patient states that since Wednesday her right leg has also been swollen and the edema would come down intermittently  She denies having any recent trauma  She is adopted thus does not know if she has a family history of DVT has never received genetic testing for DVT  She otherwise states that a month ago she was hospitalized for diarrhea  Patient states that her left leg hurts with ambulation  She has not taken any medication for pain relief  Denies any trauma to her legs or bug bites or legs  She denies having any history of cardiac disease or kidney disease  Denies any fever, chest pain, shortness of breath, nausea, vomiting, abdominal pain dysuria constipation diarrhea  Patient also denies having any recent medication changes  Prior to Admission Medications   Prescriptions Last Dose Informant Patient Reported? Taking?    ARIPiprazole (ABILIFY) 10 mg tablet   Yes Yes   Sig: Take 10 mg by mouth daily   SUMAtriptan (IMITREX) 100 mg tablet   Yes No   Sig: Take 100 mg by mouth once as needed for migraine   busPIRone (BUSPAR) 30 MG tablet   Yes Yes   Sig: Take 30 mg by mouth 2 (two) times a day   carBAMazepine (TEGretol) 200 mg tablet   Yes Yes   Sig: Take 200 mg by mouth 2 (two) times a day   dicyclomine (BENTYL) 20 mg tablet   No No   Sig: Take 1 tablet (20 mg total) by mouth every 6 (six) hours for 30 days   escitalopram (LEXAPRO) 10 mg tablet   Yes Yes   Sig: Take 5 mg by mouth daily at bedtime     escitalopram (LEXAPRO) 5 mg tablet   Yes Yes   Sig: Take 5 mg by mouth daily AM    gabapentin (NEURONTIN) 600 MG tablet   Yes Yes   Sig: Take 600 mg by mouth 3 (three) times a day   hydrOXYzine HCL (ATARAX) 50 mg tablet   Yes Yes   Sig: Take 50 mg by mouth 4 (four) times a day as needed for itching   loperamide (IMODIUM) 2 mg capsule   No Yes   Sig: Take 1 capsule (2 mg total) by mouth 3 (three) times a day as needed for diarrhea for up to 30 days   melatonin 3 mg   Yes Yes   Sig: Take 3 mg by mouth daily at bedtime   propranolol (INDERAL) 20 mg tablet   Yes No   Sig: Take 20 mg by mouth daily AM   propranolol (INDERAL) 40 mg tablet   Yes No   Sig: Take 40 mg by mouth daily at bedtime   topiramate (TOPAMAX) 100 mg tablet   Yes Yes   Sig: Take 100 mg by mouth 2 (two) times a day      Facility-Administered Medications: None       Past Medical History:   Diagnosis Date    [facility]     Cancer ([facility] )     ovarian    Depression        Past Surgical History:   Procedure Laterality Date    COLONOSCOPY N/A 6/11/2018    Procedure: COLONOSCOPY;  Surgeon: Kane Dillon MD;  [facility]; Service: Gastroenterology    HYSTERECTOMY      JOINT REPLACEMENT Bilateral     knee       Family History   Problem Relation Age of Onset    No Known Problems Mother     No Known Problems Father      I have reviewed and agree with the history as documented  Social History   Substance Use Topics    Smoking status: Current Every Day Smoker     Packs/day: 1 50     Years: 44 00    Smokeless tobacco: Never Used    Alcohol use No        Review of Systems   Constitutional: Negative for appetite change, chills, diaphoresis, fatigue and fever     HENT: Negative for congestion, ear discharge, ear pain, hearing loss, postnasal drip, rhinorrhea, sneezing and sore throat  Eyes: Negative for pain, discharge and redness  Respiratory: Negative for choking, chest tightness, shortness of breath, wheezing and stridor  Cardiovascular: Negative for chest pain and palpitations  Gastrointestinal: Negative for abdominal distention, abdominal pain, blood in stool, constipation, diarrhea, nausea and vomiting  Genitourinary: Negative for decreased urine volume, difficulty urinating, dysuria, flank pain, frequency and hematuria  Musculoskeletal: Negative for arthralgias, gait problem, joint swelling and neck pain  Bilateral leg swelling  Skin: Negative for color change, pallor and rash  Allergic/Immunologic: Negative for environmental allergies, food allergies and immunocompromised state  Neurological: Negative for dizziness, seizures, weakness, light-headedness, numbness and headaches  Hematological: Negative for adenopathy  Does not bruise/bleed easily  Psychiatric/Behavioral: Negative for agitation and behavioral problems  Physical Exam  ED Triage Vitals   Temperature Pulse Respirations Blood Pressure SpO2   07/06/18 1835 07/06/18 1812 07/06/18 1812 07/06/18 1812 07/06/18 1812   98 4 °F (36 9 °C) 85 18 140/77 96 %      Temp Source Heart Rate Source Patient Position - Orthostatic VS BP Location FiO2 (%)   07/06/18 1835 07/06/18 1812 07/06/18 1812 07/06/18 1812 --   Oral Monitor Lying Right arm       Pain Score       07/06/18 1812       7           Orthostatic Vital Signs  Vitals:    07/06/18 1911 07/06/18 1941 07/06/18 2011 07/06/18 2015   BP: 116/65 105/71 128/64 128/64   Pulse: 76 87 76 72   Patient Position - Orthostatic VS: Lying Lying Lying        Physical Exam   Constitutional: She is oriented to person, place, and time  She appears well-developed and well-nourished  HENT:   Head: Normocephalic and atraumatic     Nose: Nose normal    Mouth/Throat: Oropharynx is clear and moist    Eyes: Conjunctivae and EOM are normal  Pupils are equal, round, and reactive to light  Neck: Normal range of motion  Neck supple  Cardiovascular: Normal rate, regular rhythm and normal heart sounds  Exam reveals no gallop and no friction rub  No murmur heard  Pulmonary/Chest: Effort normal and breath sounds normal    Abdominal: Soft  Bowel sounds are normal  She exhibits no distension  There is no tenderness  There is no rebound and no guarding  Musculoskeletal: Normal range of motion  She exhibits edema and tenderness  She exhibits no deformity  Tenderness along left ankle and lower left calf   Neurological: She is alert and oriented to person, place, and time  She has normal strength and normal reflexes  No cranial nerve deficit or sensory deficit  She displays a negative Romberg sign  Skin: Skin is warm and dry  No erythema  No pallor  Psychiatric: She has a normal mood and affect  Her behavior is normal    Nursing note and vitals reviewed        ED Medications  Medications   HYDROmorphone (DILAUDID) injection 1 mg (1 mg Intravenous Given 7/6/18 1842)   acetaminophen (TYLENOL) tablet 975 mg (975 mg Oral Given 7/6/18 1942)   HYDROmorphone (DILAUDID) injection 0 5 mg (0 5 mg Intravenous Given 7/6/18 2042)       Diagnostic Studies  Results Reviewed     Procedure Component Value Units Date/Time    Comprehensive metabolic panel [[id number]] Collected:  07/06/18 1839    Lab Status:  Final result Specimen:  Blood from Arm, Left Updated:  07/06/18 1919     Sodium 139 mmol/L      Potassium 3 7 mmol/L      Chloride 108 mmol/L      CO2 25 mmol/L      Anion Gap 6 mmol/L      BUN 6 mg/dL      Creatinine 0 71 mg/dL      Glucose 101 mg/dL      Calcium 8 4 mg/dL      AST 15 U/L      ALT 20 U/L      Alkaline Phosphatase 101 U/L      Total Protein 7 2 g/dL      Albumin 3 5 g/dL      Total Bilirubin 0 23 mg/dL      eGFR 96 ml/min/1 73sq m     Narrative:         National Kidney Disease Education Program recommendations are as follows:  GFR calculation is accurate only with a steady state creatinine  Chronic Kidney disease less than 60 ml/min/1 73 sq  meters  Kidney failure less than 15 ml/min/1 73 sq  meters  CBC and Platelet [[id number]]  (Normal) Collected:  07/06/18 1839    Lab Status:  Final result Specimen:  Blood from Arm, Left Updated:  07/06/18 1850     WBC 7 76 Thousand/uL      RBC 4 02 Million/uL      Hemoglobin 11 8 g/dL      Hematocrit 36 8 %      MCV 92 fL      MCH 29 4 pg      MCHC 32 1 g/dL      RDW 14 5 %      Platelets 374 Thousands/uL      MPV 9 4 fL                  VAS lower limb venous duplex study, complete bilateral    (Results Pending)         Procedures  Procedures      Phone Consults  ED Phone Contact    ED Course  ED Course as of Jul 07 0155 Fri Jul 06, 2018 2007 Per vascular tech, patient does not have a DVT                                MDM  Number of Diagnoses or Management Options  Left leg pain:   Leg swelling:   Diagnosis management comments:   42-year-old  female presents to the emergency department for evaluation of bilateral low leg swelling  I will order DVT  The Doppler ultrasound study of his lower extremities to evaluate for DVT  Also treat patient's pain with Dilaudid and then reassess the patient      CritCare Time    Disposition  Final diagnoses:   Leg swelling - bilateral   Left leg pain     Time reflects when diagnosis was documented in both MDM as applicable and the Disposition within this note     Time User Action Codes Description Comment    7/6/2018  8:11 PM Alin Shawnee Add [Z96 961] Varicose veins of leg with swelling, bilateral     7/6/2018  8:11 PM Alin Shawnee Remove [D68 829] Varicose veins of leg with swelling, bilateral     7/6/2018  8:11 PM Alin Shawnee Add [M79 89] Leg swelling     7/6/2018  8:15 PM Alin Shawnee Add [M79 605] Left leg pain     7/6/2018  8:15 PM Alin Shawnee Modify [M79 89] Leg swelling bilateral      ED Disposition ED Disposition Condition Comment    Discharge  Татьяна Morales discharge to home/self care      Condition at discharge: Stable        Follow-up Information     Follow up With Specialties Details Why Contact Info    Lidia Brooke, DO Family Medicine In 3 days  [address] [facility] [address] [phone]            Discharge Medication List as of 7/6/2018  8:16 PM      START taking these medications    Details   ibuprofen (MOTRIN) 600 mg tablet Take 1 tablet (600 mg total) by mouth every 6 (six) hours as needed for mild pain, Starting Fri 7/6/2018, Print         CONTINUE these medications which have NOT CHANGED    Details   ARIPiprazole (ABILIFY) 10 mg tablet Take 10 mg by mouth daily, Historical Med      busPIRone (BUSPAR) 30 MG tablet Take 30 mg by mouth 2 (two) times a day, Historical Med      carBAMazepine (TEGretol) 200 mg tablet Take 200 mg by mouth 2 (two) times a day, Historical Med      !! escitalopram (LEXAPRO) 10 mg tablet Take 5 mg by mouth daily at bedtime  , Historical Med      !! escitalopram (LEXAPRO) 5 mg tablet Take 5 mg by mouth daily AM , Historical Med      gabapentin (NEURONTIN) 600 MG tablet Take 600 mg by mouth 3 (three) times a day, Historical Med      hydrOXYzine HCL (ATARAX) 50 mg tablet Take 50 mg by mouth 4 (four) times a day as needed for itching, Historical Med      loperamide (IMODIUM) 2 mg capsule Take 1 capsule (2 mg total) by mouth 3 (three) times a day as needed for diarrhea for up to 30 days, Starting Mon 6/11/2018, Until Wed 7/11/2018, Print      melatonin 3 mg Take 3 mg by mouth daily at bedtime, Historical Med      topiramate (TOPAMAX) 100 mg tablet Take 100 mg by mouth 2 (two) times a day, Historical Med      dicyclomine (BENTYL) 20 mg tablet Take 1 tablet (20 mg total) by mouth every 6 (six) hours for 30 days, Starting Mon 6/11/2018, Until Wed 7/11/2018, Print      !! propranolol (INDERAL) 20 mg tablet Take 20 mg by mouth daily AM, Historical Med      !! propranolol (INDERAL) 40 mg tablet Take 40 mg by mouth daily at bedtime, Historical Med      SUMAtriptan (IMITREX) 100 mg tablet Take 100 mg by mouth once as needed for migraine, Historical Med       !! - Potential duplicate medications found  Please discuss with provider  Outpatient Discharge Orders  [facility]         ED Provider  Attending physically available and evaluated Adam Freeman Dirk  I managed the patient along with the ED Attending      Electronically Signed by         Jaime Washington MD  07/07/18 9980

## 2020-01-04 ENCOUNTER — APPOINTMENT (EMERGENCY)
Dept: CT IMAGING | Facility: HOSPITAL | Age: 60
End: 2020-01-04
Payer: COMMERCIAL

## 2020-01-04 ENCOUNTER — HOSPITAL ENCOUNTER (EMERGENCY)
Facility: HOSPITAL | Age: 60
Discharge: HOME | End: 2020-01-05
Attending: EMERGENCY MEDICINE | Admitting: EMERGENCY MEDICINE
Payer: COMMERCIAL

## 2020-01-04 ENCOUNTER — APPOINTMENT (EMERGENCY)
Dept: RADIOLOGY | Facility: HOSPITAL | Age: 60
End: 2020-01-04
Payer: COMMERCIAL

## 2020-01-04 DIAGNOSIS — S52.91XA CLOSED RIGHT RADIAL FRACTURE: ICD-10-CM
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
## 2020-01-04 PROCEDURE — 29125 APPL SHORT ARM SPLINT STATIC: CPT | Performed by: EMERGENCY MEDICINE

## 2020-01-04 PROCEDURE — 70450 CT HEAD/BRAIN W/O DYE: CPT

## 2020-01-04 PROCEDURE — 73110 X-RAY EXAM OF WRIST: CPT

## 2020-01-04 PROCEDURE — 72125 CT NECK SPINE W/O DYE: CPT

## 2020-01-04 PROCEDURE — 99284 EMERGENCY DEPT VISIT MOD MDM: CPT

## 2020-01-04 PROCEDURE — 99284 EMERGENCY DEPT VISIT MOD MDM: CPT | Performed by: EMERGENCY MEDICINE

## 2020-01-04 PROCEDURE — 71046 X-RAY EXAM CHEST 2 VIEWS: CPT

## 2020-01-04 PROCEDURE — 96372 THER/PROPH/DIAG INJ SC/IM: CPT

## 2020-01-04 PROCEDURE — 73564 X-RAY EXAM KNEE 4 OR MORE: CPT

## 2020-01-04 RX ORDER — KETOROLAC TROMETHAMINE 30 MG/ML
30 INJECTION, SOLUTION INTRAMUSCULAR; INTRAVENOUS ONCE
Status: COMPLETED | OUTPATIENT
Start: 2020-01-04 | End: 2020-01-04

## 2020-01-04 RX ADMIN — KETOROLAC TROMETHAMINE 30 MG: 30 INJECTION, SOLUTION INTRAMUSCULAR; INTRAVENOUS at 21:57

## 2020-01-05 VITALS
WEIGHT: 246.03 LBS | SYSTOLIC BLOOD PRESSURE: 123 MMHG | HEART RATE: 99 BPM | RESPIRATION RATE: 20 BRPM | DIASTOLIC BLOOD PRESSURE: 93 MMHG | TEMPERATURE: 98.7 F | BODY MASS INDEX: 36.33 KG/M2 | OXYGEN SATURATION: 97 %

## 2020-01-05 NOTE — ED PROVIDER NOTES
History  Chief Complaint   Patient presents with   [address] stated that pt was home with friend  EMS satted that pt had a few drink when she went outside and tripped on a curb and injuried her right wrist and bilateral knees  Patient is a 59-year-old right-hand-dominant female who presents with multiple pain complaints status post a trip and fall tonight  Patient admits to drinking 112 oz can of beer when she went to go see a friend  Patient states she tripped on the curb striking her head  No loss of conscious  Complaint of sharp 10/10 pain in the wrist   No radiation  Worse with movement better with rest   Denies any numbness tingling or weakness  Also complaining of bilateral rib and knee pain  No medicines taken patient states that she did not get up and try and walk afterwards  Was brought in by EMS  Patient states that she is currently on Suboxone for pain management  States that she has been clean off pills for 2 years  Prior to Admission Medications   Prescriptions Last Dose Informant Patient Reported? Taking?   dicyclomine (BENTYL) 20 mg tablet   No No   Sig: Take 1 tablet (20 mg total) by mouth every 6 (six) hours for 30 days      Facility-Administered Medications: None       Past Medical History:   Diagnosis Date    [facility] )     Cancer ([facility] )     ovarian    Depression        Past Surgical History:   Procedure Laterality Date    COLONOSCOPY N/A 6/11/2018    Procedure: COLONOSCOPY;  Surgeon: Jas Eugene MD;  [facility]; Service: Gastroenterology    HYSTERECTOMY      JOINT REPLACEMENT Bilateral     knee       Family History   Problem Relation Age of Onset    No Known Problems Mother     No Known Problems Father      I have reviewed and agree with the history as documented      Social History     Tobacco Use    Smoking status: Current Every Day Smoker     Packs/day: 1 00     Years: 44 00     Pack years: 44 00     Types: Cigarettes    Smokeless tobacco: Never Used   Substance Use Topics    Alcohol use: Yes    Drug use: No     Comment: donovan 1 5yrs        Review of Systems   Constitutional: Negative  HENT: Negative  Eyes: Negative  Respiratory: Negative  Cardiovascular: Negative  Gastrointestinal: Negative  Endocrine: Negative  Genitourinary: Negative  Musculoskeletal: Positive for arthralgias and myalgias  Skin: Negative  Allergic/Immunologic: Negative  Neurological: Negative  Negative for seizures, weakness and numbness  Hematological: Negative  Psychiatric/Behavioral: Negative  All other systems reviewed and are negative  Physical Exam  Physical Exam   Constitutional: She is oriented to person, place, and time  She appears well-developed and well-nourished  HENT:   Head: Normocephalic and atraumatic  Right Ear: External ear normal    Left Ear: External ear normal    Nose: Nose normal    Mouth/Throat: Oropharynx is clear and moist    Patient without any swelling, tenderness to palpation, no septal hematoma, no hemotympanum, no orbital tenderness to palpation, no TMJ tenderness, no malocclusion  Eyes: Pupils are equal, round, and reactive to light  Conjunctivae and EOM are normal    Neck: Normal range of motion  Neck supple  No posterior tenderness palpation step-off or deformity  Cardiovascular: Normal rate, regular rhythm, normal heart sounds and intact distal pulses  Pulmonary/Chest: Effort normal and breath sounds normal  No stridor  She has no wheezes  No reproducible point tenderness palpation on the chest wall  Abdominal: Soft  Bowel sounds are normal    Musculoskeletal: She exhibits edema, tenderness and deformity  Patient with tenderness swelling and deformity at the right radial aspect to the wrist   No elbow pain  No other snuffbox or hand pain  No leg shortening or external rotation bilaterally     Neurological: She is alert and oriented to person, place, and time  No cranial nerve deficit or sensory deficit  She exhibits normal muscle tone  Coordination normal    Skin: Skin is warm and dry  Capillary refill takes less than 2 seconds  No skin breakdown   Psychiatric: She has a normal mood and affect  Nursing note and vitals reviewed  Vital Signs  ED Triage Vitals [01/04/20 2225]   Temperature Pulse Respirations Blood Pressure SpO2   98 7 °F (37 1 °C) (!) 115 18 (!) 180/108 99 %      Temp Source Heart Rate Source Patient Position - Orthostatic VS BP Location FiO2 (%)   Tympanic Monitor Sitting Left arm --      Pain Score       9           Vitals:    01/04/20 2225 01/05/20 0105   BP: (!) 180/108 123/93   Pulse: (!) 115 99   Patient Position - Orthostatic VS: Sitting Sitting         Visual Acuity      ED Medications  Medications   ketorolac (TORADOL) injection 30 mg (30 mg Intramuscular Given 1/4/20 2157)       Diagnostic Studies  Results Reviewed     None                 CT head without contrast   Final Result by Zi Levine MD (01/04 2311)      No acute intracranial abnormality  Workstation performed: [id number]         CT spine cervical without contrast   Final Result by Zi Levine MD (01/05 0100)      No cervical spine fracture or traumatic malalignment  Workstation performed: [id number]         XR chest pa & lateral   ED Interpretation by Magaly Diallo MD (01/04 2304)   NAD      XR knee 4+ vw left injury   ED Interpretation by Magaly Diallo MD (01/04 2305)   S/p replacement  nad      XR knee 4+ vw right injury   ED Interpretation by Magaly Diallo MD (01/04 2305)   S/p replacement  nad      XR wrist 3+ views RIGHT   ED Interpretation by Magaly Diallo MD (01/04 2305)   Distal radial fracture                 Procedures  Procedures         ED Course  ED Course as of Jan 05 1813   Sat Jan 04, 2020   2245  Sugar Tong Splint was placed by me  Examined by me post splint placement    Splint is in good position and neurovascular exam intact after splint placement  MDM  Number of Diagnoses or Management Options  Closed right radial fracture:   Fall, initial encounter:      Amount and/or Complexity of Data Reviewed  Tests in the radiology section of CPT®: ordered and reviewed  Review and summarize past medical records: yes  Independent visualization of images, tracings, or specimens: yes          Disposition  Final diagnoses:   Fall, initial encounter   Closed right radial fracture     Time reflects when diagnosis was documented in both MDM as applicable and the Disposition within this note     Time User Action Codes Description Comment    1/4/2020 11:52 PM Felipa Fine Add Numchula Jungman  CTYN] Fall, initial encounter     1/4/2020 11:52 PM [place] Fine Add [S52 91XA] Closed right radial fracture       ED Disposition     ED Disposition Condition Date/Time Comment    Discharge Stable Sat Jan 4, 2020 11:52 PM Татьяна Morales discharge to home/self care  Follow-up Information     Follow up With Specialties Details Why [address] [place] MD [facility]   [address]  [phone]            Discharge Medication List as of 1/4/2020 11:53 PM      CONTINUE these medications which have NOT CHANGED    Details   dicyclomine (BENTYL) 20 mg tablet Take 1 tablet (20 mg total) by mouth every 6 (six) hours for 30 days, Starting Mon 6/11/2018, Until Wed 7/11/2018, Print           No discharge procedures on file      ED Provider  Electronically Signed by           Bertha Mcdonnell MD  01/05/20 6828

## 2020-01-05 NOTE — ED PROCEDURE NOTE
PROCEDURE  Splint application  Date/Time: 1/4/2020 10:45 PM  Performed by: Erin Noel MD  Authorized by: Erin Noel MD     Performing Provider:  Attending  Other Assisting Provider: No    Consent:     Consent obtained:  Verbal    Consent given by:  Patient    Risks discussed:  Discoloration, numbness, pain and swelling    Alternatives discussed:  No treatment and delayed treatment  Universal protocol:     Procedure explained and questions answered to patient or proxy's satisfaction: yes      Immediately prior to procedure a time out was called: yes      Patient identity confirmed:  Verbally with patient  Indication:     Indications: fracture    Pre-procedure details:     Sensation:  Normal  Procedure details:     Laterality:  Right    Location:  Wrist    Wrist:  R wrist    Strapping: no      Splint type:  Sugar tong    Supplies:  Ortho-Glass  Post-procedure details:     Pain:  Improved    Sensation:  Normal    Neurovascular Exam: skin pink and capillary refill <2 sec      Patient tolerance of procedure:   Tolerated well, no immediate complications         Erin Noel MD  01/04/20 8635

## 2020-01-07 ENCOUNTER — OFFICE VISIT (OUTPATIENT)
Dept: OBGYN CLINIC | Facility: CLINIC | Age: 60
End: 2020-01-07
Payer: COMMERCIAL

## 2020-01-07 ENCOUNTER — TELEPHONE (OUTPATIENT)
Dept: OBGYN CLINIC | Facility: HOSPITAL | Age: 60
End: 2020-01-07

## 2020-01-07 VITALS
HEART RATE: 77 BPM | DIASTOLIC BLOOD PRESSURE: 86 MMHG | SYSTOLIC BLOOD PRESSURE: 152 MMHG | BODY MASS INDEX: 36.43 KG/M2 | WEIGHT: 246 LBS | HEIGHT: 69 IN

## 2020-01-07 DIAGNOSIS — S52.601A CLOSED FRACTURE DISTAL RADIUS AND ULNA, RIGHT, INITIAL ENCOUNTER: Primary | ICD-10-CM

## 2020-01-07 DIAGNOSIS — S52.501A CLOSED FRACTURE DISTAL RADIUS AND ULNA, RIGHT, INITIAL ENCOUNTER: Primary | ICD-10-CM

## 2020-01-07 PROCEDURE — 99203 OFFICE O/P NEW LOW 30 MIN: CPT | Performed by: ORTHOPAEDIC SURGERY

## 2020-01-07 RX ORDER — IBUPROFEN 800 MG/1
800 TABLET ORAL 3 TIMES DAILY PRN
COMMUNITY
Start: 2019-11-14

## 2020-01-07 RX ORDER — BUPRENORPHINE HYDROCHLORIDE AND NALOXONE HYDROCHLORIDE DIHYDRATE 8; 2 MG/1; MG/1
TABLET SUBLINGUAL
COMMUNITY
Start: 2019-11-15 | End: 2020-01-17 | Stop reason: HOSPADM

## 2020-01-07 NOTE — PROGRESS NOTES
Chief Complaint   Patient presents with    Right Wrist - Fracture, Pain           Assessment:  Comminuted fracture right distal radius    Plan :  The patient is currently comfortable in her sugar-tong splint and I instructed her to elevate the arm as much as possible for the next few days to decrease swelling  She can put ice in a large trash bag on top of the splint for 15 minutes 4 times a day  She can take Advil, Aleve, or Tylenol if needed  Because of the splintered nature and intra-articular extension of this fracture, I will send her to see my partner Dr Filiberto Luis, one of our expert hand surgeons, for his definitive treatment planning  HPI:   This 61-year-old white female fell on her outstretched right wrist on 01/04/2020  She had multiple bruises but all x-rays of head, neck, chest, knee were negative  She just sustained a significant wrist fracture in her dominant right wrist   She has pain, but no numbness, tingling, or paresthesias  She does have bipolar disease and  depression which will be contributory to her postop course    PMHx:         Past Medical History:   Diagnosis Date    [facility]     Cancer ([facility] )     ovarian    Depression        Past Surgical History:   Procedure Laterality Date    COLONOSCOPY N/A 6/11/2018    Procedure: COLONOSCOPY;  Surgeon: Gagan Reagan MD;  [facility];   Service: Gastroenterology    HYSTERECTOMY      JOINT REPLACEMENT Bilateral     knee       Family History   Problem Relation Age of Onset    No Known Problems Mother     No Known Problems Father        Social History     Socioeconomic History    Marital status: Single     Spouse name: Not on file    Number of children: Not on file    Years of education: Not on file    Highest education level: Not on file   Occupational History    Not on file   Social Needs    Financial resource strain: Not on file    Food insecurity:     Worry: Not on file     Inability: Not on file   [employer] needs:     Medical: Not on file     Non-medical: Not on file   Tobacco Use    Smoking status: Current Every Day Smoker     Packs/day: 1 00     Years: 44 00     Pack years: 44 00     Types: Cigarettes    Smokeless tobacco: Never Used   Substance and Sexual Activity    Alcohol use: Yes    Drug use: No     Comment: donovan 1 5yrs    Sexual activity: Not Currently   Lifestyle    Physical activity:     Days per week: Not on file     Minutes per session: Not on file    Stress: Not on file   Relationships    Social connections:     Talks on phone: Not on file     Gets together: Not on file     Attends [employer] service: Not on file     Active member of club or organization: Not on file     Attends meetings of clubs or organizations: Not on file     Relationship status: Not on file    Intimate partner violence:     Fear of current or ex partner: Not on file     Emotionally abused: Not on file     Physically abused: Not on file     Forced sexual activity: Not on file   Other Topics Concern    Not on file   Social History Narrative    Not on file       Current Outpatient Medications   Medication Sig Dispense Refill    buprenorphine-naloxone (SUBOXONE) 8-2 mg per SL tablet TAKE 1 TO 1 1/2 SUBLINGUALLY TWICE DAILY      ibuprofen (MOTRIN) 800 mg tablet Take 800 mg by mouth 3 (three) times a day as needed      dicyclomine (BENTYL) 20 mg tablet Take 1 tablet (20 mg total) by mouth every 6 (six) hours for 30 days 120 tablet 1     No current facility-administered medications for this visit  Allergies: Latex and Other    ROS:  Positive for chronic diarrhea, history of cancer, anxiety/depression, bipolar disorder, and orthopedic complaints as above  The remaining 7/12 systems on the intake sheet that I reviewed were negative  PE:  /86   Pulse 77   Ht 5' 9" (1 753 m)   Wt 112 kg (246 lb)   BMI 36 33 kg/m²   Constitutional: The patient was  oriented to person, place, and time  Well-developed and well-nourished  In no acute distress  HEENT: Vision intact  Hearing normal  Swallowing normal   Head: Normocephalic  Neck is supple with good extension  Cardiovascular: Intact distal pulses  Pulse regular  Heart regular rate and rhythm  Pulmonary/Chest: Effort normal  No respiratory distress  Lungs clear to P&A  Ana M Serum No rales or rhonchi heard   Abdomen:  Soft, nontender, good bowel sounds  Neurological: Alert and oriented to person, place, and time  Skin: Skin is warm  Psychiatric: Normal mood and affect  Ortho Exam:  She had a sugar-tong splint on her right arm in good position  She had mild swelling of her fingers with normal motion of the fingers and normal sensation to light touch  There is no rubbing of the splint proximally or distally    Studies reviewed:  I personally reviewed right wrist x-rays as well as the radiology reports of the other x-rays which were negative  She has a comminuted intra-articular fracture right distal radius with dorsal angulation and some shortening    There appears to be a dorsal radial fragment also

## 2020-01-07 NOTE — PATIENT INSTRUCTIONS
Plan :  The patient is currently comfortable in her sugar-tong splint and I instructed her to elevate the arm as much as possible for the next few days to decrease swelling  She can put ice in a large trash bag on top of the splint for 15 minutes 4 times a day  She can take Advil, Aleve, or Tylenol if needed  Because of the splintered nature and intra-articular extension of this fracture, I will send her to see my partner Dr Ernesto Purvis, one of our expert hand surgeons, for his definitive treatment planning    I explained the proposed operation, wrist, alternatives, postop course clearly and fully to the patient and this will be reviewed also by my partner

## 2020-01-07 NOTE — TELEPHONE ENCOUNTER
Татьяна called in returning a call from our office  Татьяна believed it may have had something to do with scheduling her surgery  [facility] , after confirming that she had reached out to the patient

## 2020-01-07 NOTE — LETTER
January 7, 2020     Maximo Paredes   51  [id number]    Patient: Caroline Garibay   YOB: 1960   Date of Visit: 1/7/2020       Dear Dr Ayah Gaines:    Thank you for referring Sinan Boss to me for evaluation  Below are my notes for this consultation  If you have questions, please do not hesitate to call me  I look forward to following your patient along with you  Sincerely,        Darlin Maddox MD        CC: MD Darlin Mercado MD  1/7/2020  9:08 AM  Incomplete  Chief Complaint   Patient presents with    Right Wrist - Fracture, Pain           Assessment:  Comminuted fracture right distal radius    Plan :  The patient is currently comfortable in her sugar-tong splint and I instructed her to elevate the arm as much as possible for the next few days to decrease swelling  She can put ice in a large trash bag on top of the splint for 15 minutes 4 times a day  She can take Advil, Aleve, or Tylenol if needed  Because of the splintered nature and intra-articular extension of this fracture, I will send her to see my partner Dr Mirlande Lucas, one of our expert hand surgeons, for his definitive treatment planning  HPI:   This 72-year-old white female fell on her outstretched right wrist on 01/04/2020  She had multiple bruises but all x-rays of head, neck, chest, knee were negative  She just sustained a significant wrist fracture in her dominant right wrist   She has pain, but no numbness, tingling, or paresthesias  She does have bipolar disease and  depression which will be contributory to her postop course    PMHx:         Past Medical History:   Diagnosis Date    [facility]     Cancer ([facility] )     ovarian    Depression        Past Surgical History:   Procedure Laterality Date    COLONOSCOPY N/A 6/11/2018    Procedure: COLONOSCOPY;  Surgeon: Micah Holland MD;  [facility];   Service: Gastroenterology    [facility] JOINT REPLACEMENT Bilateral     knee       Family History   Problem Relation Age of Onset    No Known Problems Mother     No Known Problems Father        Social History     Socioeconomic History    Marital status: Single     Spouse name: Not on file    Number of children: Not on file    Years of education: Not on file    Highest education level: Not on file   Occupational History    Not on file   Social Needs    Financial resource strain: Not on file    Food insecurity:     Worry: Not on file     Inability: Not on file    Transportation needs:     Medical: Not on file     Non-medical: Not on file   Tobacco Use    Smoking status: Current Every Day Smoker     Packs/day: 1 00     Years: 44 00     Pack years: 44 00     Types: Cigarettes    Smokeless tobacco: Never Used   Substance and Sexual Activity    Alcohol use:  Yes    Drug use: No     Comment: donovan 1 5yrs    Sexual activity: Not Currently   Lifestyle    Physical activity:     Days per week: Not on file     Minutes per session: Not on file    Stress: Not on file   Relationships    Social connections:     Talks on phone: Not on file     Gets together: Not on file     Attends R[employer] service: Not on file     Active member of club or organization: Not on file     Attends meetings of clubs or organizations: Not on file     Relationship status: Not on file    Intimate partner violence:     Fear of current or ex partner: Not on file     Emotionally abused: Not on file     Physically abused: Not on file     Forced sexual activity: Not on file   Other Topics Concern    Not on file   Social History Narrative    Not on file       Current Outpatient Medications   Medication Sig Dispense Refill    buprenorphine-naloxone (SUBOXONE) 8-2 mg per SL tablet TAKE 1 TO 1 1/2 SUBLINGUALLY TWICE DAILY      ibuprofen (MOTRIN) 800 mg tablet Take 800 mg by mouth 3 (three) times a day as needed      dicyclomine (BENTYL) 20 mg tablet Take 1 tablet (20 mg total) by mouth every 6 (six) hours for 30 days 120 tablet 1     No current facility-administered medications for this visit  Allergies: Latex and Other    ROS:  Positive for chronic diarrhea, history of cancer, anxiety/depression, bipolar disorder, and orthopedic complaints as above  The remaining 7/12 systems on the intake sheet that I reviewed were negative  PE:  /86   Pulse 77   Ht 5' 9" (1 753 m)   Wt 112 kg (246 lb)   BMI 36 33 kg/m²    Constitutional: The patient was  oriented to person, place, and time  Well-developed and well-nourished  In no acute distress  HEENT: Vision intact  Hearing normal  Swallowing normal   Head: Normocephalic  Cardiovascular: Intact distal pulses  Pulse regular  Pulmonary/Chest: Effort normal  No respiratory distress  Neurological: Alert and oriented to person, place, and time  Skin: Skin is warm  Psychiatric: Normal mood and affect  Ortho Exam:  She had a sugar-tong splint on her right arm in good position  She had mild swelling of her fingers with normal motion of the fingers and normal sensation to light touch  There is no rubbing of the splint proximally or distally    Studies reviewed:  I personally reviewed right wrist x-rays as well as the radiology reports of the other x-rays which were negative  She has a comminuted intra-articular fracture right distal radius with dorsal angulation and some shortening    There appears to be a dorsal radial fragment also

## 2020-01-09 ENCOUNTER — OFFICE VISIT (OUTPATIENT)
Dept: OBGYN CLINIC | Facility: MEDICAL CENTER | Age: 60
End: 2020-01-09
Payer: COMMERCIAL

## 2020-01-09 ENCOUNTER — APPOINTMENT (OUTPATIENT)
Dept: RADIOLOGY | Facility: MEDICAL CENTER | Age: 60
End: 2020-01-09
Payer: COMMERCIAL

## 2020-01-09 VITALS
HEIGHT: 69 IN | BODY MASS INDEX: 35.99 KG/M2 | SYSTOLIC BLOOD PRESSURE: 135 MMHG | HEART RATE: 77 BPM | WEIGHT: 243 LBS | DIASTOLIC BLOOD PRESSURE: 83 MMHG

## 2020-01-09 DIAGNOSIS — M25.531 PAIN IN RIGHT WRIST: ICD-10-CM

## 2020-01-09 DIAGNOSIS — S52.501A CLOSED FRACTURE DISTAL RADIUS AND ULNA, RIGHT, INITIAL ENCOUNTER: Primary | ICD-10-CM

## 2020-01-09 DIAGNOSIS — S52.601A CLOSED FRACTURE DISTAL RADIUS AND ULNA, RIGHT, INITIAL ENCOUNTER: Primary | ICD-10-CM

## 2020-01-09 PROCEDURE — 73110 X-RAY EXAM OF WRIST: CPT

## 2020-01-09 PROCEDURE — 29125 APPL SHORT ARM SPLINT STATIC: CPT | Performed by: ORTHOPAEDIC SURGERY

## 2020-01-09 PROCEDURE — 99214 OFFICE O/P EST MOD 30 MIN: CPT | Performed by: ORTHOPAEDIC SURGERY

## 2020-01-09 RX ORDER — CEFAZOLIN SODIUM 2 G/50ML
2000 SOLUTION INTRAVENOUS ONCE
Status: CANCELLED | OUTPATIENT
Start: 2020-01-17 | End: 2020-01-09

## 2020-01-09 RX ORDER — NAPROXEN 500 MG/1
500 TABLET ORAL 2 TIMES DAILY WITH MEALS
Qty: 30 TABLET | Refills: 0 | Status: SHIPPED | OUTPATIENT
Start: 2020-01-09 | End: 2020-01-17 | Stop reason: HOSPADM

## 2020-01-09 NOTE — PROGRESS NOTES
Chief Complaint     Right displaced, intra-articular, comminuted distal radius fracture      History of Present Illness     Татьяна Head is a 64 y o  female presents the office today for evaluation of her right wrist   I am seeing her in consultation at the request of Dr Sergio Ramirez  Patient states on 01/04/2020 she sustained a fall injuring her right wrist   She was seen in the ED as well as in the clinic by Dr Sergio Ramirez  She has been immobilized in the splint  She states her pain is significant today however she has only been taking Motrin  She does have a history of drug abuse and is currently taking Suboxone, which is managed by Dr Lyndon Rizvi  She notes no distal paresthesias today  Patient admits to smoking a pack a day  She denies any history of diabetes  Past Medical History:   Diagnosis Date    [facility]     Cancer ([facility] )     ovarian    Depression        Past Surgical History:   Procedure Laterality Date    COLONOSCOPY N/A 6/11/2018    Procedure: COLONOSCOPY;  Surgeon: Corrinne Alderman, MD;  [facility]: [facility]; Service: Gastroenterology    HYSTERECTOMY      JOINT REPLACEMENT Bilateral     knee       Allergies   Allergen Reactions    Latex     Other Rash     Adhesive tape, Natural rubber       Current Outpatient Medications on File Prior to Visit   Medication Sig Dispense Refill    buprenorphine-naloxone (SUBOXONE) 8-2 mg per SL tablet TAKE 1 TO 1 1/2 SUBLINGUALLY TWICE DAILY      ibuprofen (MOTRIN) 800 mg tablet Take 800 mg by mouth 3 (three) times a day as needed      lurasidone (LATUDA) 40 mg tablet Take 20 mg by mouth daily with dinner      dicyclomine (BENTYL) 20 mg tablet Take 1 tablet (20 mg total) by mouth every 6 (six) hours for 30 days 120 tablet 1     No current facility-administered medications on file prior to visit          Social History     Tobacco Use    Smoking status: Current Every Day Smoker     Packs/day: 1 00     Years: 44 00     Pack years: 44 00     Types: Cigarettes    Smokeless tobacco: Never Used   Substance Use Topics    Alcohol use: Yes    Drug use: No     Comment: donovan 1 5yrs       Family History   Problem Relation Age of Onset    No Known Problems Mother     No Known Problems Father        Review of Systems     As stated in the HPI  All other systems were reviewed and are negative  Physical Exam     /83   Pulse 77   Ht 5' 9" (1 753 m)   Wt 110 kg (243 lb)   BMI 35 88 kg/m²     GENERAL: This is a well-developed, well-nourished, age-appropriate patient in no acute distress  The patient is alert and oriented x3  Pleasant and cooperative  Eyes: Anicteric sclerae  Extraocular movements appear intact  HENT: Nares are patent with no drainage  Lungs: There is equal chest rise on inspection  Breathing is non-labored with no audible wheezing  Cardiovascular: No cyanosis  No upper extremity lymphadema  Skin: Skin is warm to touch  No obvious skin lesions or rashes other than described below  Neurologic: No ataxia  Psychiatric: Mood and affect are appropriate  Right wrist  Skin intact  Mild swelling about the wrist  Tender to palpate fracture site at distal radius  Range of motion testing deferred due to fracture  At the level of the wrist, but she has a DPC of 0 for her fingers  5/5 Motor to the APB, FDI, FDP2, FDP5, EDC  Sensation intact to light touch in the median, radial, and ulnar nerve distribution  Data Review     Results Reviewed     None             Imaging:  X-rays of the right wrist obtained on 01/09/2020 were personally reviewed by me in the office today and demonstrate a comminuted, intra-articular, displaced distal radius fracture    Assessment and Plan      Diagnoses and all orders for this visit:    Pain in right wrist  -     XR wrist 3+ vw right; Future    Other orders  -     lurasidone (LATUDA) 40 mg tablet;  Take 20 mg by mouth daily with dinner             70-year-old female with right comminuted, intra-articular, displaced distal radius fracture  Due to the nature of fracture surgical intervention is warranted  We discussed surgery in the office today, as well as the risks and benefits  Risk of the surgery are inclusive of but not limited to bleeding, infection, nerve injury, blood clot, worsening of symptoms, not achieving the anticipated results, persistent stiffness, weakness and the need for additional surgery  The patient verbally stated they understood those risks and would like to proceed with the surgery  This time she will be placed in a volar wrist splint for comfort prior to surgery  She was encouraged to move her fingers in the splint  Regards to a pain management after surgery, will be in touch with Dr Annetta Flores, who is the primary prescribe her of her Suboxone  Dr Annetta Flores barber called at [phone]  I also have faxed him the office note from today's visit [phone]  Patient was also prescribed naproxen 500 b i d  To help reduce her pain prior to surgery  For surgical planning purposes I also ordered her a CT scan today to further evaluate the fracture site  Follow Up:  Postoperatively    To Do Next Visit:  New x-rays, sutures out    PROCEDURES PERFORMED:  Splint application  Date/Time: 1/9/2020 11:23 AM  Performed by: Juliana Benitez MD  Authorized by: Juliana Benitez MD     Consent:     Consent given by:  Patient  Procedure details:     Laterality:  Right    Location:  Wrist    Wrist:  R wrist    Splint type:  Short arm splint, static (forearm to hand)    Supplies:  Ortho-Glass, cotton padding and elastic bandage  Post-procedure details:     Pain:  Unchanged    Sensation:  Normal    Patient tolerance of procedure:   Tolerated well, no immediate complications      Scribe Attestation    I,:   Jose Luis Trevizo MA am acting as a scribe while in the presence of the attending physician :        I,:   Juliana Benitez MD personally performed the services described in this documentation    as scribed in my presence :

## 2020-01-09 NOTE — H&P (VIEW-ONLY)
Chief Complaint     Right displaced, intra-articular, comminuted distal radius fracture      History of Present Illness     Татьяна Jay is a 64 y o  female presents the office today for evaluation of her right wrist   I am seeing her in consultation at the request of Dr King Neff  Patient states on 01/04/2020 she sustained a fall injuring her right wrist   She was seen in the ED as well as in the clinic by Dr King Neff  She has been immobilized in the splint  She states her pain is significant today however she has only been taking Motrin  She does have a history of drug abuse and is currently taking Suboxone, which is managed by Dr Hoang Kiser  She notes no distal paresthesias today  Patient admits to smoking a pack a day  She denies any history of diabetes  Past Medical History:   Diagnosis Date    [facility]     [facility]     ovarian    Depression        Past Surgical History:   Procedure Laterality Date    COLONOSCOPY N/A 6/11/2018    Procedure: COLONOSCOPY;  Surgeon: Eliza Clarke MD;  [facility]; Service: Gastroenterology    HYSTERECTOMY      JOINT REPLACEMENT Bilateral     knee       Allergies   Allergen Reactions    Latex     Other Rash     Adhesive tape, Natural rubber       Current Outpatient Medications on File Prior to Visit   Medication Sig Dispense Refill    buprenorphine-naloxone (SUBOXONE) 8-2 mg per SL tablet TAKE 1 TO 1 1/2 SUBLINGUALLY TWICE DAILY      ibuprofen (MOTRIN) 800 mg tablet Take 800 mg by mouth 3 (three) times a day as needed      lurasidone (LATUDA) 40 mg tablet Take 20 mg by mouth daily with dinner      dicyclomine (BENTYL) 20 mg tablet Take 1 tablet (20 mg total) by mouth every 6 (six) hours for 30 days 120 tablet 1     No current facility-administered medications on file prior to visit          Social History     Tobacco Use    Smoking status: Current Every Day Smoker     Packs/day: 1 00     Years: 44 00     Pack years: 44 00     Types: Cigarettes    Smokeless tobacco: Never Used   Substance Use Topics    Alcohol use: Yes    Drug use: No     Comment: donovan 1 5yrs       Family History   Problem Relation Age of Onset    No Known Problems Mother     No Known Problems Father        Review of Systems     As stated in the HPI  All other systems were reviewed and are negative  Physical Exam     /83   Pulse 77   Ht 5' 9" (1 753 m)   Wt 110 kg (243 lb)   BMI 35 88 kg/m²     GENERAL: This is a well-developed, well-nourished, age-appropriate patient in no acute distress  The patient is alert and oriented x3  Pleasant and cooperative  Eyes: Anicteric sclerae  Extraocular movements appear intact  HENT: Nares are patent with no drainage  Lungs: There is equal chest rise on inspection  Breathing is non-labored with no audible wheezing  Cardiovascular: No cyanosis  No upper extremity lymphadema  Skin: Skin is warm to touch  No obvious skin lesions or rashes other than described below  Neurologic: No ataxia  Psychiatric: Mood and affect are appropriate  Right wrist  Skin intact  Mild swelling about the wrist  Tender to palpate fracture site at distal radius  Range of motion testing deferred due to fracture  At the level of the wrist, but she has a DPC of 0 for her fingers  5/5 Motor to the APB, FDI, FDP2, FDP5, EDC  Sensation intact to light touch in the median, radial, and ulnar nerve distribution  Data Review     Results Reviewed     None             Imaging:  X-rays of the right wrist obtained on 01/09/2020 were personally reviewed by me in the office today and demonstrate a comminuted, intra-articular, displaced distal radius fracture    Assessment and Plan      Diagnoses and all orders for this visit:    Pain in right wrist  -     XR wrist 3+ vw right; Future    Other orders  -     lurasidone (LATUDA) 40 mg tablet;  Take 20 mg by mouth daily with dinner             43-year-old female with right comminuted, intra-articular, displaced distal radius fracture  Due to the nature of fracture surgical intervention is warranted  We discussed surgery in the office today, as well as the risks and benefits  Risk of the surgery are inclusive of but not limited to bleeding, infection, nerve injury, blood clot, worsening of symptoms, not achieving the anticipated results, persistent stiffness, weakness and the need for additional surgery  The patient verbally stated they understood those risks and would like to proceed with the surgery  This time she will be placed in a volar wrist splint for comfort prior to surgery  She was encouraged to move her fingers in the splint  Regards to a pain management after surgery, will be in touch with Dr Lyndon Rizvi, who is the primary prescribe her of her Suboxone  Dr Lyndon Rizvi barber called at [phone]  I also have faxed him the office note from today's visit [phone]  Patient was also prescribed naproxen 500 b i d  To help reduce her pain prior to surgery  For surgical planning purposes I also ordered her a CT scan today to further evaluate the fracture site  Follow Up:  Postoperatively    To Do Next Visit:  New x-rays, sutures out    PROCEDURES PERFORMED:  Splint application  Date/Time: 1/9/2020 11:23 AM  Performed by: Kimmie Peterson MD  Authorized by: Kimmie Peterson MD     Consent:     Consent given by:  Patient  Procedure details:     Laterality:  Right    Location:  Wrist    Wrist:  R wrist    Splint type:  Short arm splint, static (forearm to hand)    Supplies:  Ortho-Glass, cotton padding and elastic bandage  Post-procedure details:     Pain:  Unchanged    Sensation:  Normal    Patient tolerance of procedure:   Tolerated well, no immediate complications      Scribe Attestation    I,:   Rich White MA am acting as a scribe while in the presence of the attending physician :        I,:   Kimmie Peterson MD personally performed the services described in this documentation    as scribed in my presence :

## 2020-01-10 ENCOUNTER — TELEPHONE (OUTPATIENT)
Dept: OBGYN CLINIC | Facility: HOSPITAL | Age: 60
End: 2020-01-10

## 2020-01-10 NOTE — TELEPHONE ENCOUNTER
Dr Arianne Gomez is calling regarding the patients medications and he would like to speak to you about her suboxone and how to manage them

## 2020-01-14 ENCOUNTER — HOSPITAL ENCOUNTER (OUTPATIENT)
Dept: NON INVASIVE DIAGNOSTICS | Facility: HOSPITAL | Age: 60
Discharge: HOME | End: 2020-01-14

## 2020-01-14 ENCOUNTER — TRANSCRIBE ORDERS (OUTPATIENT)
Dept: ADMINISTRATIVE | Facility: HOSPITAL | Age: 60
End: 2020-01-14

## 2020-01-14 ENCOUNTER — HOSPITAL ENCOUNTER (OUTPATIENT)
Dept: CT IMAGING | Facility: HOSPITAL | Age: 60
Discharge: HOME | End: 2020-01-14
Attending: ORTHOPAEDIC SURGERY
Payer: COMMERCIAL

## 2020-01-14 ENCOUNTER — LAB (OUTPATIENT)
Dept: LAB | Facility: HOSPITAL | Age: 60
End: 2020-01-14
Payer: COMMERCIAL

## 2020-01-14 DIAGNOSIS — F31.30 BIPOLAR I DISORDER, MOST RECENT EPISODE DEPRESSED (HCC): Primary | ICD-10-CM

## 2020-01-14 DIAGNOSIS — F31.30 BIPOLAR I DISORDER, MOST RECENT EPISODE DEPRESSED (HCC): ICD-10-CM

## 2020-01-14 DIAGNOSIS — S52.601A CLOSED FRACTURE DISTAL RADIUS AND ULNA, RIGHT, INITIAL ENCOUNTER: ICD-10-CM

## 2020-01-14 DIAGNOSIS — S52.501A CLOSED FRACTURE DISTAL RADIUS AND ULNA, RIGHT, INITIAL ENCOUNTER: ICD-10-CM

## 2020-01-14 LAB
ALBUMIN SERPL BCP-MCNC: 4.2 G/DL (ref 3–5.2)
ALP SERPL-CCNC: 86 U/L (ref 43–122)
ALT SERPL W P-5'-P-CCNC: 25 U/L (ref 9–52)
ANION GAP SERPL CALCULATED.3IONS-SCNC: 9 MMOL/L (ref 5–14)
AST SERPL W P-5'-P-CCNC: 22 U/L (ref 14–36)
BASOPHILS # BLD AUTO: 0.1 THOUSANDS/ΜL (ref 0–0.1)
BASOPHILS NFR BLD AUTO: 1 % (ref 0–1)
BILIRUB SERPL-MCNC: 0.6 MG/DL
BUN SERPL-MCNC: 11 MG/DL (ref 5–25)
CALCIUM SERPL-MCNC: 9.4 MG/DL (ref 8.4–10.2)
CHLORIDE SERPL-SCNC: 105 MMOL/L (ref 97–108)
CHOLEST SERPL-MCNC: 231 MG/DL
CO2 SERPL-SCNC: 24 MMOL/L (ref 22–30)
CREAT SERPL-MCNC: 0.57 MG/DL (ref 0.6–1.2)
EOSINOPHIL # BLD AUTO: 0.1 THOUSAND/ΜL (ref 0–0.4)
EOSINOPHIL NFR BLD AUTO: 1 % (ref 0–6)
ERYTHROCYTE [DISTWIDTH] IN BLOOD BY AUTOMATED COUNT: 14.7 %
EST. AVERAGE GLUCOSE BLD GHB EST-MCNC: 126 MG/DL
FOLATE SERPL-MCNC: 14.6 NG/ML (ref 3.1–17.5)
GFR SERPL CREATININE-BSD FRML MDRD: 103 ML/MIN/1.73SQ M
GGT SERPL-CCNC: 15 U/L (ref 5–85)
GLUCOSE P FAST SERPL-MCNC: 88 MG/DL (ref 70–99)
HBA1C MFR BLD: 6 % (ref 4.2–6.3)
HCT VFR BLD AUTO: 44.8 % (ref 36–46)
HDLC SERPL-MCNC: 47 MG/DL
HGB BLD-MCNC: 15.2 G/DL (ref 12–16)
LDLC SERPL CALC-MCNC: 158 MG/DL
LYMPHOCYTES # BLD AUTO: 1.9 THOUSANDS/ΜL (ref 0.5–4)
LYMPHOCYTES NFR BLD AUTO: 21 % (ref 25–45)
MCH RBC QN AUTO: 30.6 PG (ref 26–34)
MCHC RBC AUTO-ENTMCNC: 33.8 G/DL (ref 31–36)
MCV RBC AUTO: 91 FL (ref 80–100)
MONOCYTES # BLD AUTO: 0.5 THOUSAND/ΜL (ref 0.2–0.9)
MONOCYTES NFR BLD AUTO: 6 % (ref 1–10)
NEUTROPHILS # BLD AUTO: 6.2 THOUSANDS/ΜL (ref 1.8–7.8)
NEUTS SEG NFR BLD AUTO: 71 % (ref 45–65)
NONHDLC SERPL-MCNC: 184 MG/DL
PLATELET # BLD AUTO: 318 THOUSANDS/UL (ref 150–450)
PMV BLD AUTO: 7.9 FL (ref 8.9–12.7)
POTASSIUM SERPL-SCNC: 4 MMOL/L (ref 3.6–5)
PROT SERPL-MCNC: 7.5 G/DL (ref 5.9–8.4)
RBC # BLD AUTO: 4.95 MILLION/UL (ref 4–5.2)
SODIUM SERPL-SCNC: 138 MMOL/L (ref 137–147)
T4 FREE SERPL-MCNC: 1.03 NG/DL (ref 0.76–1.46)
TRIGL SERPL-MCNC: 129 MG/DL
TSH SERPL DL<=0.05 MIU/L-ACNC: 0.96 UIU/ML (ref 0.47–4.68)
VIT B12 SERPL-MCNC: 582 PG/ML (ref 100–900)
WBC # BLD AUTO: 8.8 THOUSAND/UL (ref 4.5–11)

## 2020-01-14 PROCEDURE — 85025 COMPLETE CBC W/AUTO DIFF WBC: CPT

## 2020-01-14 PROCEDURE — 80307 DRUG TEST PRSMV CHEM ANLYZR: CPT | Performed by: PSYCHIATRY & NEUROLOGY

## 2020-01-14 PROCEDURE — 82607 VITAMIN B-12: CPT

## 2020-01-14 PROCEDURE — 80053 COMPREHEN METABOLIC PANEL: CPT

## 2020-01-14 PROCEDURE — 36415 COLL VENOUS BLD VENIPUNCTURE: CPT

## 2020-01-14 PROCEDURE — 84439 ASSAY OF FREE THYROXINE: CPT

## 2020-01-14 PROCEDURE — 83036 HEMOGLOBIN GLYCOSYLATED A1C: CPT

## 2020-01-14 PROCEDURE — 82977 ASSAY OF GGT: CPT

## 2020-01-14 PROCEDURE — 84443 ASSAY THYROID STIM HORMONE: CPT

## 2020-01-14 PROCEDURE — 73200 CT UPPER EXTREMITY W/O DYE: CPT

## 2020-01-14 PROCEDURE — 82746 ASSAY OF FOLIC ACID SERUM: CPT

## 2020-01-14 PROCEDURE — 80061 LIPID PANEL: CPT

## 2020-01-14 PROCEDURE — 93005 ELECTROCARDIOGRAM TRACING: CPT

## 2020-01-15 LAB
ATRIAL RATE: 79 BPM
OXYCODONE+OXYMORPHONE UR QL SCN: NEGATIVE NG/ML
P AXIS: 52 DEGREES
PR INTERVAL: 138 MS
QRS AXIS: 36 DEGREES
QRSD INTERVAL: 76 MS
QT INTERVAL: 370 MS
QTC INTERVAL: 424 MS
T WAVE AXIS: 45 DEGREES
VENTRICULAR RATE: 79 BPM

## 2020-01-15 PROCEDURE — 93010 ELECTROCARDIOGRAM REPORT: CPT | Performed by: INTERNAL MEDICINE

## 2020-01-16 ENCOUNTER — ANESTHESIA EVENT (OUTPATIENT)
Dept: PERIOP | Facility: HOSPITAL | Age: 60
End: 2020-01-16
Payer: COMMERCIAL

## 2020-01-16 RX ORDER — BUPRENORPHINE HYDROCHLORIDE 8 MG/1
TABLET SUBLINGUAL 2 TIMES DAILY
COMMUNITY

## 2020-01-16 NOTE — PRE-PROCEDURE INSTRUCTIONS
Pre-Surgery Instructions:   Medication Instructions    buprenorphine (SUBUTEX) 8 mg Instructed patient per Anesthesia Guidelines   dicyclomine (BENTYL) 20 mg tablet Instructed patient per Anesthesia Guidelines   lurasidone (LATUDA) 40 mg tablet Instructed patient per Anesthesia Guidelines   naproxen (EC NAPROSYN) 500 MG EC tablet Instructed patient per Anesthesia Guidelines  Patient via TC and per Dr Pennington instructed to take*buprenorphine SL*with a sip of water the morning of surgery  Patient given/ instructed on use of chlorhexidine soap per hospital protocol    Patient instructed to stop all ASA, NSAIDS, vitamins and herbal supplements one week prior to surgery or per Dr Amee Law no

## 2020-01-17 ENCOUNTER — HOSPITAL ENCOUNTER (OUTPATIENT)
Facility: HOSPITAL | Age: 60
Setting detail: OUTPATIENT SURGERY
Discharge: HOME | End: 2020-01-17
Attending: ORTHOPAEDIC SURGERY | Admitting: ORTHOPAEDIC SURGERY
Payer: COMMERCIAL

## 2020-01-17 ENCOUNTER — ANESTHESIA (OUTPATIENT)
Dept: PERIOP | Facility: HOSPITAL | Age: 60
End: 2020-01-17
Payer: COMMERCIAL

## 2020-01-17 ENCOUNTER — TELEPHONE (OUTPATIENT)
Dept: OBGYN CLINIC | Facility: HOSPITAL | Age: 60
End: 2020-01-17

## 2020-01-17 ENCOUNTER — HOSPITAL ENCOUNTER (OUTPATIENT)
Dept: RADIOLOGY | Facility: HOSPITAL | Age: 60
Setting detail: OUTPATIENT SURGERY
Discharge: HOME | End: 2020-01-17
Payer: COMMERCIAL

## 2020-01-17 VITALS
RESPIRATION RATE: 18 BRPM | HEART RATE: 81 BPM | HEIGHT: 69 IN | OXYGEN SATURATION: 97 % | TEMPERATURE: 98.5 F | SYSTOLIC BLOOD PRESSURE: 121 MMHG | DIASTOLIC BLOOD PRESSURE: 67 MMHG | BODY MASS INDEX: 35.4 KG/M2 | WEIGHT: 239 LBS

## 2020-01-17 DIAGNOSIS — S52.501A CLOSED FRACTURE DISTAL RADIUS AND ULNA, RIGHT, INITIAL ENCOUNTER: ICD-10-CM

## 2020-01-17 DIAGNOSIS — S52.601A CLOSED FRACTURE DISTAL RADIUS AND ULNA, RIGHT, INITIAL ENCOUNTER: ICD-10-CM

## 2020-01-17 DIAGNOSIS — S52.501A CLOSED FRACTURE DISTAL RADIUS AND ULNA, RIGHT, INITIAL ENCOUNTER: Primary | ICD-10-CM

## 2020-01-17 DIAGNOSIS — S52.601A CLOSED FRACTURE DISTAL RADIUS AND ULNA, RIGHT, INITIAL ENCOUNTER: Primary | ICD-10-CM

## 2020-01-17 PROCEDURE — C1713 ANCHOR/SCREW BN/BN,TIS/BN: HCPCS | Performed by: ORTHOPAEDIC SURGERY

## 2020-01-17 PROCEDURE — 25609 OPTX DST RD XART FX/EP SEP3+: CPT | Performed by: PHYSICIAN ASSISTANT

## 2020-01-17 PROCEDURE — 73110 X-RAY EXAM OF WRIST: CPT

## 2020-01-17 PROCEDURE — 73110 X-RAY EXAM OF WRIST: CPT | Performed by: ORTHOPAEDIC SURGERY

## 2020-01-17 PROCEDURE — 25609 OPTX DST RD XART FX/EP SEP3+: CPT | Performed by: ORTHOPAEDIC SURGERY

## 2020-01-17 DEVICE — 2.5 CORTICAL SCREW 12MM, HD7, 5/PKG
Type: IMPLANTABLE DEVICE | Site: WRIST | Status: FUNCTIONAL
Brand: APTUS

## 2020-01-17 DEVICE — 2.5 CORTICAL SCREW 14MM, HD7, 5/PKG
Type: IMPLANTABLE DEVICE | Site: WRIST | Status: FUNCTIONAL
Brand: APTUS

## 2020-01-17 DEVICE — 2.5 TRILOCK SCREW 22MM, HD7, 1/PKG
Type: IMPLANTABLE DEVICE | Site: WRIST | Status: FUNCTIONAL
Brand: APTUS

## 2020-01-17 DEVICE — SCREW CORT 2.5 X 13MM HEXADRIVE 7: Type: IMPLANTABLE DEVICE | Site: WRIST | Status: FUNCTIONAL

## 2020-01-17 DEVICE — 2.5 CORTICAL SCREW 18MM, HD7, 5/PKG
Type: IMPLANTABLE DEVICE | Site: WRIST | Status: FUNCTIONAL
Brand: APTUS

## 2020-01-17 DEVICE — 2.5 CORTICAL SCREW 20MM, HD7, 5/PKG
Type: IMPLANTABLE DEVICE | Site: WRIST | Status: FUNCTIONAL
Brand: APTUS

## 2020-01-17 DEVICE — 2.5 CORTICAL SCREW 22MM, HD7, 5/PKG
Type: IMPLANTABLE DEVICE | Site: WRIST | Status: FUNCTIONAL
Brand: APTUS

## 2020-01-17 DEVICE — 2.5 TRILOCK SCREW 20MM, HD7, 1/PKG
Type: IMPLANTABLE DEVICE | Site: WRIST | Status: FUNCTIONAL
Brand: APTUS

## 2020-01-17 DEVICE — 2.5 TRILOCK SCREW 18MM, HD7, 1/PKG
Type: IMPLANTABLE DEVICE | Site: WRIST | Status: FUNCTIONAL
Brand: APTUS

## 2020-01-17 DEVICE — 2.5 TRILOCK DIST.RAD. SMALLFRAGMENTPLATE
Type: IMPLANTABLE DEVICE | Site: WRIST | Status: FUNCTIONAL
Brand: APTUS

## 2020-01-17 DEVICE — 2.5 TRILOCK SCREW 24MM, HD7, 1/PKG
Type: IMPLANTABLE DEVICE | Site: WRIST | Status: FUNCTIONAL
Brand: APTUS

## 2020-01-17 RX ORDER — OXYCODONE HYDROCHLORIDE 5 MG/1
TABLET ORAL
Qty: 15 TABLET | Refills: 0 | Status: SHIPPED | OUTPATIENT
Start: 2020-01-17 | End: 2020-01-17 | Stop reason: SDUPTHER

## 2020-01-17 RX ORDER — HYDROMORPHONE HCL/PF 1 MG/ML
0.5 SYRINGE (ML) INJECTION
Status: DISCONTINUED | OUTPATIENT
Start: 2020-01-17 | End: 2020-01-17

## 2020-01-17 RX ORDER — ROPIVACAINE HYDROCHLORIDE 5 MG/ML
INJECTION, SOLUTION EPIDURAL; INFILTRATION; PERINEURAL AS NEEDED
Status: DISCONTINUED | OUTPATIENT
Start: 2020-01-17 | End: 2020-01-17 | Stop reason: SURG

## 2020-01-17 RX ORDER — OXYCODONE HYDROCHLORIDE 5 MG/1
5 TABLET ORAL ONCE AS NEEDED
Status: DISCONTINUED | OUTPATIENT
Start: 2020-01-17 | End: 2020-01-17 | Stop reason: HOSPADM

## 2020-01-17 RX ORDER — PROPOFOL 10 MG/ML
INJECTION, EMULSION INTRAVENOUS CONTINUOUS PRN
Status: DISCONTINUED | OUTPATIENT
Start: 2020-01-17 | End: 2020-01-17 | Stop reason: SURG

## 2020-01-17 RX ORDER — ONDANSETRON 2 MG/ML
4 INJECTION INTRAMUSCULAR; INTRAVENOUS EVERY 6 HOURS PRN
Status: DISCONTINUED | OUTPATIENT
Start: 2020-01-17 | End: 2020-01-17 | Stop reason: HOSPADM

## 2020-01-17 RX ORDER — CEFAZOLIN SODIUM 2 G/50ML
2000 SOLUTION INTRAVENOUS ONCE
Status: COMPLETED | OUTPATIENT
Start: 2020-01-17 | End: 2020-01-17

## 2020-01-17 RX ORDER — HYDROMORPHONE HCL/PF 1 MG/ML
0.25 SYRINGE (ML) INJECTION ONCE
Status: COMPLETED | OUTPATIENT
Start: 2020-01-17 | End: 2020-01-17

## 2020-01-17 RX ORDER — MIDAZOLAM HYDROCHLORIDE 2 MG/2ML
INJECTION, SOLUTION INTRAMUSCULAR; INTRAVENOUS AS NEEDED
Status: DISCONTINUED | OUTPATIENT
Start: 2020-01-17 | End: 2020-01-17 | Stop reason: SURG

## 2020-01-17 RX ORDER — SODIUM CHLORIDE 9 MG/ML
125 INJECTION, SOLUTION INTRAVENOUS CONTINUOUS
Status: DISCONTINUED | OUTPATIENT
Start: 2020-01-17 | End: 2020-01-17 | Stop reason: HOSPADM

## 2020-01-17 RX ORDER — HYDROMORPHONE HCL/PF 1 MG/ML
0.2 SYRINGE (ML) INJECTION ONCE AS NEEDED
Status: DISCONTINUED | OUTPATIENT
Start: 2020-01-17 | End: 2020-01-17 | Stop reason: HOSPADM

## 2020-01-17 RX ORDER — OXYCODONE HYDROCHLORIDE 5 MG/1
TABLET ORAL
Qty: 30 TABLET | Refills: 0 | Status: SHIPPED | OUTPATIENT
Start: 2020-01-17 | End: 2020-01-24 | Stop reason: SDUPTHER

## 2020-01-17 RX ORDER — DEXAMETHASONE SODIUM PHOSPHATE 4 MG/ML
INJECTION, SOLUTION INTRA-ARTICULAR; INTRALESIONAL; INTRAMUSCULAR; INTRAVENOUS; SOFT TISSUE AS NEEDED
Status: DISCONTINUED | OUTPATIENT
Start: 2020-01-17 | End: 2020-01-17 | Stop reason: SURG

## 2020-01-17 RX ORDER — ONDANSETRON 2 MG/ML
INJECTION INTRAMUSCULAR; INTRAVENOUS AS NEEDED
Status: DISCONTINUED | OUTPATIENT
Start: 2020-01-17 | End: 2020-01-17 | Stop reason: SURG

## 2020-01-17 RX ORDER — MEPERIDINE HYDROCHLORIDE 50 MG/ML
12.5 INJECTION INTRAMUSCULAR; INTRAVENOUS; SUBCUTANEOUS ONCE AS NEEDED
Status: DISCONTINUED | OUTPATIENT
Start: 2020-01-17 | End: 2020-01-17 | Stop reason: HOSPADM

## 2020-01-17 RX ORDER — MAGNESIUM HYDROXIDE 1200 MG/15ML
LIQUID ORAL AS NEEDED
Status: DISCONTINUED | OUTPATIENT
Start: 2020-01-17 | End: 2020-01-17 | Stop reason: HOSPADM

## 2020-01-17 RX ORDER — PROPOFOL 10 MG/ML
INJECTION, EMULSION INTRAVENOUS AS NEEDED
Status: DISCONTINUED | OUTPATIENT
Start: 2020-01-17 | End: 2020-01-17 | Stop reason: SURG

## 2020-01-17 RX ORDER — FENTANYL CITRATE/PF 50 MCG/ML
50 SYRINGE (ML) INJECTION
Status: DISCONTINUED | OUTPATIENT
Start: 2020-01-17 | End: 2020-01-17 | Stop reason: HOSPADM

## 2020-01-17 RX ORDER — ONDANSETRON 2 MG/ML
4 INJECTION INTRAMUSCULAR; INTRAVENOUS ONCE AS NEEDED
Status: DISCONTINUED | OUTPATIENT
Start: 2020-01-17 | End: 2020-01-17 | Stop reason: HOSPADM

## 2020-01-17 RX ADMIN — PROPOFOL 30 MG: 10 INJECTION, EMULSION INTRAVENOUS at 10:36

## 2020-01-17 RX ADMIN — PROPOFOL 200 MCG/KG/MIN: 10 INJECTION, EMULSION INTRAVENOUS at 10:32

## 2020-01-17 RX ADMIN — DEXAMETHASONE SODIUM PHOSPHATE 4 MG: 4 INJECTION, SOLUTION INTRAMUSCULAR; INTRAVENOUS at 10:44

## 2020-01-17 RX ADMIN — HYDROMORPHONE HYDROCHLORIDE 0.25 MG: 1 INJECTION, SOLUTION INTRAMUSCULAR; INTRAVENOUS; SUBCUTANEOUS at 08:55

## 2020-01-17 RX ADMIN — CEFAZOLIN SODIUM 2000 MG: 2 SOLUTION INTRAVENOUS at 10:27

## 2020-01-17 RX ADMIN — ROPIVACAINE HYDROCHLORIDE 20 ML: 5 INJECTION, SOLUTION EPIDURAL; INFILTRATION; PERINEURAL at 09:31

## 2020-01-17 RX ADMIN — SODIUM CHLORIDE 125 ML/HR: 0.9 INJECTION, SOLUTION INTRAVENOUS at 09:04

## 2020-01-17 RX ADMIN — PROPOFOL 20 MG: 10 INJECTION, EMULSION INTRAVENOUS at 10:42

## 2020-01-17 RX ADMIN — ONDANSETRON 4 MG: 2 INJECTION INTRAMUSCULAR; INTRAVENOUS at 11:45

## 2020-01-17 RX ADMIN — MIDAZOLAM 4 MG: 1 INJECTION INTRAMUSCULAR; INTRAVENOUS at 09:27

## 2020-01-17 RX ADMIN — ROPIVACAINE HYDROCHLORIDE 10 ML: 5 INJECTION, SOLUTION EPIDURAL; INFILTRATION; PERINEURAL at 09:33

## 2020-01-17 NOTE — DISCHARGE INSTRUCTIONS
[facility] - Dr Marcie De La Rosa (Orthopedic Surgery)    Follow-up Appointments   Please call to set up/confirm your first postoperative visit with Dr Ari Haji in 14 days  Dressing and [address] A dressing has been placed on your hand/arm to keep the incisions clean  Keep your dressing clean and dry  o Do not remove the surgical dressing/splint  It will be removed at your first postoperative office visit with the doctor or therapist     Sixto Orlando a plastic bag over your dressing/splint whenever you take a shower or bath until you are allowed to remove it   Swelling is normal after surgery  Elevate your hand/arm so the surgical site is above your heart to decrease the swelling  Swelling is like water, it runs downhill  This is especially important for the first 72 hours after surgery  o The best way to elevate your hand/arm is with your fingers pointing towards the ceiling and your hand/arm above the level of the heart   o You can use pillows to help prop your hand/arm up when sitting or lying down   If you are experiencing pain, be sure you are elevating your hand/arm as often as possible   Apply an ice pack over your dressing/splint for 20 minutes of every hour for the first 3 days when you are awake  This can help to reduce swelling and inflammation  Be sure the ice pack is waterproof so it does not leak on the dressing/splint  A simple ice pack can be made by adding ten cubes and a small amount of water in a small zip-lock bag  Seal this small bag tightly  Place this small bag in a larger zip lock bag  Apply to the area in pain   If the dressing feels too tight in spite of elevation, loosen the outer wrap but do not remove the entire dressing     If you have exposed pins/wires, take clean gauze or a cotton tip applicator (like a Q-tip), get it wet in clean warm water mixed with non-scented hand soap (like Rashaad, [place], Dial, etc ), and gently wipe around the base of the pin where it comes through the skin once a day  Do not use water from a well to clean as this has bacteria in it and can contribute to infections  ACTIVITIES:  [facility] and straighten the parts of your hand, wrist, elbow, and shoulder that are not included in your surgical dressing or splint  Do this at least 6 times a day, as this will help decrease swelling and speed up your recovery  This includes your fingers  See the instructions listed below for finger motion  THIS IS VERY IMPORTANT FOR YOUR RECOVERY! POSTOPERATIVE CARE/CONCERNS:  Beau Sebastian You may experience some temporary numbness in your fingers   You should have very little to no bleeding on your dressing   Notify the office (see contact info at bottom of page) for any of the following:  o Excessive pain not relieved by rest, elevation, and pain medications  o Feeling that the dressing is too tight in spite of adequately elevating hand/arm  o Active bleeding through the dressing  o Drainage from the wound site or pin sites  o Foul odor from the dressing/wound  o Temperature greater that 101? F or chills  o Blue or excessively cold fingertips  o Numbness of the fingertips that does not improve in spite of adequately elevating hand/arm    PAIN MEDICATION:   Pain is a normal part of the recovery after surgery  The pain medication provided to you will help to decrease the discomfort but will not completely eliminate the pain   A prescription for a narcotic pain medicine (oxycodone or hydrocodone) and anti-inflammatory (naproxen) were called in to your pharmacy  Please take the anti-inflammatory medication AND over-the-counter acetaminophen (Tylenol) regularly  The instructions will be listed on the bottles  The narcotic pain medicine should be used for pain that is not controlled by these other medications and only for the first few days after surgery   The narcotic is HIGHLY ADDICTIVE and has many side effects such as causing constipation, dizziness, confusion, decreased breathing and more  It is safe to take for a short period of time after surgery  It is almost never prescribed for longer than a few weeks and never after one month for elective surgeries   Do not take narcotic or anti-inflammatories on an empty stomach   It is illegal to drive while taking narcotic pain medication   Your pain should decrease over the first few days after surgery which will allow you to take less pain medicine, increase the time between doses of medication, or stop taking all pain medicine        OFFICE CONTACT NUMBERS   Please call [phone] with any questions about appointments or any medical concerns

## 2020-01-17 NOTE — OP NOTE
DATE OF SURGERY: 1/17/2020    SURGEON: Clemente Galan MD    PREOPERATIVE DIAGNOSIS:  Right intra-articular distal radius fracture    POSTOPERATIVE DIAGNOSIS:  Same    PROCEDURE:  Open reduction internal fixation right intra-articular distal radius fracture with greater than 3 parts    IMPLANTS:   Implant Name Type Inv   Item Serial No   Lot No  LRB No  Used Action   PLATE 5 6RW DSTL LCK L 6HL LT - [id number]  PLATE 3 9SR DSTL LCK L 6HL LT  [employer]ARTIS  Right 1 Implanted   PLATE 5 2BA DSTL LCK L 6HL RT - [id number]  PLATE 7 0WW DSTL LCK L 6HL RT  [employer]ART[employer]  Right 1 Implanted   SCREW TRILOCK 2 5 X 18MM HEXADRIVE 7 - [id number]  SCREW TRILOCK 2 5 X 18MM HEXADRIVE 7  [employer]ART[employer]  Right 1 Implanted   SCREW TRILOCK 2 5 X 20MM HEXADRIVE 7 - [id number]  SCREW TRILOCK 2 5 X 20MM HEXADRIVE 7  [employer]ARTIS  Right 1 Implanted   SCREW TRILOCK 2 5 X 22MM HEXADRIVE 7 - [id number]  SCREW TRILOCK 2 5 X 22MM HEXADRIVE 7  [employer]ART[employer]  Right 1 Implanted   SCREW TRILOCK 2 5 X 24MM HEXADRIVE 7 - [id number]  SCREW TRILOCK 2 5 X 24MM HEXADRIVE 7  [employer]ARTIS  Right 2 Implanted   SCREW ANATOLY 2 5 X 12MM HEXADRIVE 7 - [id number]  SCREW ANATOLY 2 5 X 12MM HEXADRIVE 7  [employer]ARTIS  Right 1 Implanted   SCREW ANATOLY 2 5 X 13MM HEXADRIVE 7 - [id number]  SCREW ANATOLY 2 5 X 13MM HEXADRIVE 7  [employer]ARTIS  Right 1 Implanted   SCREW ANATOLY 2 5 X 14MM HEXADRIVE 7 - [id number]  SCREW ANATOLY 2 5 X 14MM HEXADRIVE 7  [employer]ARTIS  Right 1 Implanted   SCREW ANATOLY 2 5 X 18MM HEXADRIVE 7 - [id number]  SCREW ANATOLY 2 5 X 18MM HEXADRIVE 7  [employer]ARTIS  Right 1 Implanted   SCREW ANATOLY 2 5 X 20MM HEXADRIVE 7 - [id number]  SCREW ANATOLY 2 5 X 20MM HEXADRIVE 7  [employer]ARTIS  Right 1 Implanted   SCREW ANATOLY 2 5 X 22MM HEXADRIVE 7 - [id number]  SCREW ANATOLY 2 5 X 22MM HEXADRIVE 7  MEDARTIS  Right 1 Implanted        ASSISTANTS:      * Perry Maldonado PA-C - Assisting       ANESTHESIA: General w/ Regional    ESTIMATED BLOOD LOSS: Minimal     INTRAVENOUS FLUIDS: Per anesthesia    URINE OUTPUT:  No Bradshaw    TOURNIQUET TIME:  Less than 855 minutes    COMPLICATIONS:  None    ANTIBIOTICS:  2 g Ancef    SPECIMENS: * No specimens in log *     INDICATIONS: Ba Bonner is a 64y o  year old female who sustained the above conditions  The indications for operative intervention were displaced intra-articular distal radius fracture  The alternatives to operative intervention included nonoperative management  The risks of the operative procedure were discussed in detail with the patient including but not limited to the risks of anesthesia, infection, injury to blood vessel/nerve, pain, malunion, nonunion, painful hardware, stiffness, changes in sensation, and the need for repeat surgery  The patient understood these risks and alternatives and elected to proceed with surgery  DESCRIPTION OF PROCEDURE: The patient was seen in the preoperative holding area and identification was performed as per the institution's protocol  The patient was then brought to the operating room, a briefing was held and prophylactic antibiotics were given  Anesthesia was induced  A tourniquet cuff was applied to the operative extremity, set at 250 mmHg The site was pre-scrubbed with chlorhexidine and prepped with ChloraPrep and draped in the usual sterile fashion  Following a timeout procedure, an approach was made over the dorsal wrist in line with the 3rd and 4th extensor compartment  Dissection was carried down through skin and subcutaneous tissue and any large crossing veins were ligated with 3-0 silk  Dissection was carried down to the retinaculum and blunt dissection was carried over the retinaculum and dorsal fascia to free up the large subcutaneous pocket  Sharp dissection allowed us to enter the 3rd extensor compartment taking care to protect and retract the branches of the superficial radial nerve distally  We released EPL proximally from its adhesions and transposed it    Then, with a subperiosteal approach, large flaps were raised underneath the 2nd compartment moving radially and under the 4th compartment moving ulnarly  The fracture fragments were readily identified dorsally  Traction was then placed on the joint after a capsulotomy for evaluation of the articular surface  With more manipulation and temporary Lalo wires, we were able to achieve an articular reduction  We then pre bent bent two L plates for the dorsum of the wrist and templated them to the reduced bones  Nonlocking screws were placed proximally and locking screws were placed distally to buttress and raft the fracture fragments and the joint surface respectively  Biplanar AP and lateral fluoroscopic views noted appropriate reduction and plate placement with no intra-articular screws and this was verified by direct visualization into the joint surface  In order to achieve further fixation into the ulnar fragment despite reasonable fixation into the fragment from the dorsal plates, an independent screw was placed from the dorsal ulnar aspect of the radius into the volar ulnar aspect of the radius thus linking the 2 further since the dorsal ulnar aspect had fantastic fixation to the remainder of the radius  Ranging the wrist in flexion extension radial and ulnar deviation did not yield any crepitance  The DRUJ was stable to stress both in supination, pronation and neutral   The carpus did not dislocate upon shucking  The wounds were copiously irrigated and closed in layers including a 3-0 Monocryl in the retinaculum keeping the EPL transposed, and then 3-0 Monocryl and 3-0 nylon in the skin  A sterile dressing and volar resting splint was applied  All sharps and sponge counts were correct and there were no complications  I attest that IErnesto, was present and scrubbed for the entirety of the procedure  No qualified resident was available to assist with this case   and A physician assistant was required during the procedure for retraction, tissue handling, dissection and suturing  The patient was awoken from anesthesia in good condition and taken to the PACU  PLAN: The patient will follow up in 10-14 days

## 2020-01-17 NOTE — TELEPHONE ENCOUNTER
Pharmacy is calling back stating that she has historically been on suboxone and subutex and they need the okay to fill the oxycodone and verify the doctor knew that  Patient broke down on the phone with me earlier in front of the pharmacy and they are saying she is exhibiting drug seeking behavior  Pharmacy was saying that they received two scripts with different amounts and directions on them

## 2020-01-17 NOTE — ANESTHESIA PROCEDURE NOTES
Peripheral Block    Patient location during procedure: holding area  Start time: 1/17/2020 9:27 AM  Reason for block: at surgeon's request and post-op pain management  Staffing  Anesthesiologist: Marylee Batters, MD  Performed: anesthesiologist   Preanesthetic Checklist  Completed: patient identified, site marked, surgical consent, pre-op evaluation, timeout performed, IV checked, risks and benefits discussed and monitors and equipment checked  Peripheral Block  Patient position: supine  Prep: ChloraPrep  Patient monitoring: continuous pulse ox and frequent blood pressure checks  Block type: supraclavicular  Laterality: right  Injection technique: single-shot  Procedures: ultrasound guided, Ultrasound guidance required for the procedure to increase accuracy and safety of medication placement and decrease risk of complications   and nerve stimulator  Ultrasound permanent image saved  Needle  Needle type: Stimuplex   Needle gauge: 22 G  Needle length: 5 cm  Needle localization: ultrasound guidance and nerve stimulator  Test dose: negative  Assessment  Injection assessment: incremental injection, local visualized surrounding nerve on ultrasound, negative aspiration for CSF, negative aspiration for heme and no paresthesia on injection  Paresthesia pain: none  Heart rate change: no  Slow fractionated injection: yes  Post-procedure:  site cleaned  patient tolerated the procedure well with no immediate complications

## 2020-01-17 NOTE — ANESTHESIA PREPROCEDURE EVALUATION
Review of Systems/Medical History  Patient summary reviewed  Chart reviewed  No history of anesthetic complications     Cardiovascular  Exercise tolerance (METS): >4,  Hyperlipidemia, Hypertension controlled,    Pulmonary  Smoker cigarette smoker  ,   Comment: 1 PPD x30+ years     GI/Hepatic    Liver disease , Hepatitis C,   Comment: Diarrhea, abdominal pain and blood per rectum     Negative  ROS        Endo/Other    Comment: On suboxone Obesity    GYN    Hysterectomy, Ovarian cancer,        Hematology  Anemia ,     Musculoskeletal    Arthritis     Neurology  Seizures ,  Headaches,    Psychology   Anxiety, Depression , bipolar disorder and being treated for depression,              Physical Exam    Airway  Comment: Redundant tissue  Mallampati score: III  TM Distance: >3 FB  Neck ROM: full     Dental   Comment: edentulous,     Cardiovascular  Rhythm: regular, Rate: normal,     Pulmonary  Decreased breath sounds,     Other Findings        Anesthesia Plan  ASA Score- 3     Anesthesia Type- regional and general with ASA Monitors  Additional Monitors:   Airway Plan:         Plan Factors-Patient not instructed to abstain from smoking on day of procedure       Induction- intravenous  Postoperative Plan-     Informed Consent- Anesthetic plan and risks discussed with patient

## 2020-01-17 NOTE — INTERVAL H&P NOTE
H&P reviewed  After examining the patient I find no changes in the patients condition since the H&P had been written      Vitals:    01/17/20 0945   BP: 130/67   Pulse: 84   Resp: 16   Temp:    SpO2: 98%

## 2020-01-17 NOTE — TELEPHONE ENCOUNTER
Patient is calling stating that the pharmacy is having some sort of a problem with her medication  They say they have called us but there is no encounter on it and they refused to explain to the patient what was wrong and just demanded we call them

## 2020-01-23 ENCOUNTER — TELEPHONE (OUTPATIENT)
Dept: OBGYN CLINIC | Facility: HOSPITAL | Age: 60
End: 2020-01-23

## 2020-01-23 NOTE — TELEPHONE ENCOUNTER
Patient sees Dr Александр Galindo  Patient is calling in wanting to know if there is anything she can get for pain, she is currently all out of oxycodone and tylenol is just not working for her  She is asking if something further can be done  Patient is requesting a call back relating this    Call back# [phone]

## 2020-01-23 NOTE — TELEPHONE ENCOUNTER
I spoke with patient and she states she has a burning pain to her R wrist , low grade fever 100 1 that goes down with tylenol  She has been taking the oxycodone 5mg every 4-6 hrs as directed  She is requesting refill  She will continue to take the advill 800mg TID and Tylenol 1000mg TID, ice and elevate  Please advise

## 2020-01-24 DIAGNOSIS — S52.501A CLOSED FRACTURE DISTAL RADIUS AND ULNA, RIGHT, INITIAL ENCOUNTER: ICD-10-CM

## 2020-01-24 DIAGNOSIS — S52.601A CLOSED FRACTURE DISTAL RADIUS AND ULNA, RIGHT, INITIAL ENCOUNTER: ICD-10-CM

## 2020-01-24 LAB
AMPHETAMINES UR QL SCN: NEGATIVE NG/ML
BARBITURATES UR QL SCN: NEGATIVE NG/ML
BENZODIAZ UR QL: NEGATIVE NG/ML
BZE UR QL: NEGATIVE NG/ML
CANNABINOIDS UR QL SCN: POSITIVE
METHADONE UR QL SCN: NEGATIVE NG/ML
OPIATES UR QL: NEGATIVE NG/ML
PCP UR QL: NEGATIVE NG/ML
PROPOXYPH UR QL SCN: NEGATIVE NG/ML

## 2020-01-24 RX ORDER — OXYCODONE HYDROCHLORIDE 5 MG/1
TABLET ORAL
Qty: 30 TABLET | Refills: 0 | Status: SHIPPED | OUTPATIENT
Start: 2020-01-24 | End: 2020-01-30 | Stop reason: SDUPTHER

## 2020-01-24 NOTE — TELEPHONE ENCOUNTER
I spoke to patient and made her aware oxycodone was sent to pharmacy  She seems to still be running a low grade fever than is controlled by tylenol  Paint is still increased  She will continue to monitor and call if temp above 101

## 2020-01-24 NOTE — TELEPHONE ENCOUNTER
Patient is calling to check on the status of her oxycodone refill request  She can be reached at [phone]

## 2020-01-30 ENCOUNTER — OFFICE VISIT (OUTPATIENT)
Dept: OBGYN CLINIC | Facility: MEDICAL CENTER | Age: 60
End: 2020-01-30

## 2020-01-30 ENCOUNTER — APPOINTMENT (OUTPATIENT)
Dept: RADIOLOGY | Facility: MEDICAL CENTER | Age: 60
End: 2020-01-30
Payer: COMMERCIAL

## 2020-01-30 VITALS
SYSTOLIC BLOOD PRESSURE: 113 MMHG | HEART RATE: 68 BPM | BODY MASS INDEX: 35.4 KG/M2 | HEIGHT: 69 IN | WEIGHT: 239 LBS | DIASTOLIC BLOOD PRESSURE: 97 MMHG

## 2020-01-30 DIAGNOSIS — S52.601A CLOSED FRACTURE DISTAL RADIUS AND ULNA, RIGHT, INITIAL ENCOUNTER: ICD-10-CM

## 2020-01-30 DIAGNOSIS — S52.501D CLOSED FRACTURE OF DISTAL ENDS OF RIGHT RADIUS AND ULNA WITH ROUTINE HEALING, SUBSEQUENT ENCOUNTER: Primary | ICD-10-CM

## 2020-01-30 DIAGNOSIS — S52.501A CLOSED FRACTURE DISTAL RADIUS AND ULNA, RIGHT, INITIAL ENCOUNTER: ICD-10-CM

## 2020-01-30 DIAGNOSIS — S52.501D CLOSED FRACTURE OF DISTAL ENDS OF RIGHT RADIUS AND ULNA WITH ROUTINE HEALING, SUBSEQUENT ENCOUNTER: ICD-10-CM

## 2020-01-30 DIAGNOSIS — S52.601D CLOSED FRACTURE OF DISTAL ENDS OF RIGHT RADIUS AND ULNA WITH ROUTINE HEALING, SUBSEQUENT ENCOUNTER: Primary | ICD-10-CM

## 2020-01-30 DIAGNOSIS — S52.601D CLOSED FRACTURE OF DISTAL ENDS OF RIGHT RADIUS AND ULNA WITH ROUTINE HEALING, SUBSEQUENT ENCOUNTER: ICD-10-CM

## 2020-01-30 PROCEDURE — 73110 X-RAY EXAM OF WRIST: CPT

## 2020-01-30 PROCEDURE — 99024 POSTOP FOLLOW-UP VISIT: CPT | Performed by: ORTHOPAEDIC SURGERY

## 2020-01-30 RX ORDER — OXYCODONE HYDROCHLORIDE 5 MG/1
TABLET ORAL
Qty: 20 TABLET | Refills: 0 | Status: SHIPPED | OUTPATIENT
Start: 2020-01-30 | End: 2020-02-03 | Stop reason: SDUPTHER

## 2020-01-30 NOTE — PROGRESS NOTES
History of the Present Illness     Татьяна Delgadillo is a 64 y o  female status post open reduction internal fixation right distal radius fracture  Patient is doing well with stable but possibly decreasing pain  Denies numbness or tingling  Has been compliant with nonweightbearing          Physical Exam     /97   Pulse 68   Ht 5' 9" (1 753 m)   Wt 108 kg (239 lb)   BMI 35 29 kg/m²     Examination right upper extremity reveals a well-healed incision on the dorsum of the wrist   Wrist range of motion is slightly limited secondary to pain  Full digital motion with DPC 0  Sensation intact to light touch in median radial ulnar nerve distribution  5/5 motor in all distal motor groups  Brisk capillary refill    Data Review       Imaging: Three-view imaging of the right wrist taken in the office and personally reviewed by me today shows evidence of stable alignment of internal fixation with stable alignment of distal radius fracture    Assessment and Plan       33-year-old female status post open reduction internal fixation right distal radius fracture  Patient is feeling well and healing appropriately  Discussed initiation of range of motion and will give her wrist brace to [facility] when she is not practicing motion exercises  I prescribed therapy exercises for her today and a therapy visit and they will find a time and place that is closest to her home   She will follow up with me in a month    Follow Up:   1 month    To Do Next Visit:  Three-view x-rays right wrist    PROCEDURES PERFORMED:  Procedures  No Procedures performed today

## 2020-02-03 ENCOUNTER — TELEPHONE (OUTPATIENT)
Dept: OBGYN CLINIC | Facility: HOSPITAL | Age: 60
End: 2020-02-03

## 2020-02-03 DIAGNOSIS — S52.501A CLOSED FRACTURE DISTAL RADIUS AND ULNA, RIGHT, INITIAL ENCOUNTER: ICD-10-CM

## 2020-02-03 DIAGNOSIS — S52.601A CLOSED FRACTURE DISTAL RADIUS AND ULNA, RIGHT, INITIAL ENCOUNTER: ICD-10-CM

## 2020-02-03 RX ORDER — OXYCODONE HYDROCHLORIDE 5 MG/1
TABLET ORAL
Qty: 20 TABLET | Refills: 0 | Status: SHIPPED | OUTPATIENT
Start: 2020-02-03 | End: 2020-02-10 | Stop reason: SDUPTHER

## 2020-02-03 NOTE — TELEPHONE ENCOUNTER
Refilled  Please call and advise her that it will be ready soon, and also that she should aggressively wean and speak to her suboxone doctor about being off of all oxycodone by 1 month post op  Thanks!

## 2020-02-03 NOTE — TELEPHONE ENCOUNTER
Pt contacted Call Center requested refill of their medication  Medication Name: oxycodone      Dosage of Med:      Frequency of Med:      Remaining Medication: 3 tabs      Pharmacy and Location: uses [facility] on [address]        Pt  Preferred Callback Phone Number:      Thank you  PLEASE ADVISE PATIENTS:    REFILL REQUESTS WILL BE PROCESSED WITHIN 24-48 HOURS

## 2020-02-05 ENCOUNTER — EVALUATION (OUTPATIENT)
Dept: PHYSICAL THERAPY | Facility: MEDICAL CENTER | Age: 60
End: 2020-02-05
Payer: COMMERCIAL

## 2020-02-05 DIAGNOSIS — S52.601D CLOSED FRACTURE OF DISTAL ENDS OF RIGHT RADIUS AND ULNA WITH ROUTINE HEALING, SUBSEQUENT ENCOUNTER: Primary | ICD-10-CM

## 2020-02-05 DIAGNOSIS — S52.501D CLOSED FRACTURE OF DISTAL ENDS OF RIGHT RADIUS AND ULNA WITH ROUTINE HEALING, SUBSEQUENT ENCOUNTER: Primary | ICD-10-CM

## 2020-02-05 PROCEDURE — 97161 PT EVAL LOW COMPLEX 20 MIN: CPT

## 2020-02-05 PROCEDURE — 97140 MANUAL THERAPY 1/> REGIONS: CPT

## 2020-02-05 NOTE — PROGRESS NOTES
PT Evaluation     Today's date: 2020  Patient name: Kaia Khan  : 1960  MRN: [id number]  Referring provider: Anne Edwards MD  Dx:   Encounter Diagnosis     ICD-10-CM    1  Closed fracture of distal ends of right radius and ulna with routine healing, subsequent encounter S52 [id number] Ambulatory referral to PT/OT hand therapy    S52 [id number]                   Assessment  Assessment details: Pt is a 64year old RHD female who presents to PT following R distal radial and ulnar fracture s/p ORIF to DRF  She presents with moderate edema to dorsal wrist and hand, good healing of her incision with no associated scar tissue, and tenderness to her distal radius and ulna  She has significant LOM in her wrist with mild lag in her digit extension and add/abd  Fabricated Pt a custom wrist cock up to better provide comfort for her wrist, pt happy with fit and feel  She should benefit from skilled PT to help reduce edema and inflammation, improve ROM, improve PROM in a few weeks, improve strength when appropriate, and better her overall functioning   Thank you kindly for your referral    Impairments: abnormal or restricted ROM, activity intolerance, impaired physical strength and pain with function  Understanding of Dx/Px/POC: good   Prognosis: good    Goals  STG (2-3 weeks)  1: Reduce pain at rest by 2-3 grades  2: Improve AROM 10-15 degrees  3: Pt independent in HEP  4: Reduce edema by 25%  5: Full motion in digits    LTG (6-8 weeks)  1: Improve FOTO 20 pts  2: PROM to 50-55 degree ext/flex  3: Supination to 65-70 degrees  4: Pt able to perform > 50% of ADL's        Plan  Plan details: Initiate PT as per POC  Patient would benefit from: skilled physical therapy  Planned modality interventions: thermotherapy: hydrocollator packs and cryotherapy  Planned therapy interventions: manual therapy, massage, joint mobilization, stretching, therapeutic activities, therapeutic exercise, graded activity, functional ROM exercises, flexibility, fine motor coordination training and home exercise program  Frequency: 2x week  Duration in visits: 16  Duration in weeks: 8  Plan of Care beginning date: 2020  Plan of Care expiration date: 2020  Treatment plan discussed with: patient        Subjective Evaluation    History of Present Illness  Date of onset: 2020  Date of surgery: 2020  Mechanism of injury: surgery and trauma  Mechanism of injury: Walking, foot hit an object and fell  Went to ER  ORIF on   No previous injuries to R hand/wrist  Denies N/T  Has been having a lot of pain to dorsum of hand  Activity restrictions                  Not a recurrent problem   Quality of life: good    Pain  Current pain ratin  At best pain ratin  At worst pain ratin  Location: worse at night  Quality: dull ache  Relieving factors: ice and medications  Progression: no change    Social Support    Employment status: not working (on disability)  Hand dominance: right  Exercise history: puzzles, books      Diagnostic Tests  X-ray: normal (hardware intact)  Patient Goals  Patient goals for therapy: decreased edema, decreased pain, increased motion, increased strength, independence with ADLs/IADLs and return to sport/leisure activities          Objective     Tenderness     Additional Tenderness Details  Tenderness to distal radius ulnar styloid, DRUJ  No pain with palpation to carpals or proximal shafts    Neurological Testing     Sensation     Wrist/Hand     Right   Intact: light touch    Active Range of Motion     Left Wrist   Wrist flexion: 85 degrees   Wrist extension: 75 degrees   Radial deviation: 20 degrees   Ulnar deviation: 60 degrees     Right Wrist   Wrist flexion: 25 degrees   Wrist extension: 0 degrees   Radial deviation: 7 degrees   Ulnar deviation: 20 degrees     Right Thumb   Opposition: Thumb opp to base of RF      Additional Active Range of Motion Details  Sup/pron  R: 15/75  L: 80/75  Full comp flexion of digits  Mild extension lag  mild lag in digit add/abd    Swelling     Left Wrist/Hand   Circumference wrist: 23 4 cm    Right Wrist/Hand   Circumference wrist: 25 8 cm             Precautions: DOI 1/4/20; DOS 1/17/20  LATEX AND ADHESIVE TAPE ALLERGY  OA, HTN, PTSD, ovarian CA      Manual              Retromassage/scar massage             AAROM             Long flexor stretch                                           Exercise Diary              Towel bunches             Wrist AROM             Forearm AROM             Pegs grasping             Nuts/bolts                                                                                                                                                            HEP: digit, wrist, forearm AROM                                                        Modalities                           CP PRN

## 2020-02-10 ENCOUNTER — OFFICE VISIT (OUTPATIENT)
Dept: PHYSICAL THERAPY | Facility: MEDICAL CENTER | Age: 60
End: 2020-02-10
Payer: COMMERCIAL

## 2020-02-10 ENCOUNTER — TELEPHONE (OUTPATIENT)
Dept: OBGYN CLINIC | Facility: MEDICAL CENTER | Age: 60
End: 2020-02-10

## 2020-02-10 DIAGNOSIS — S52.501D CLOSED FRACTURE OF DISTAL ENDS OF RIGHT RADIUS AND ULNA WITH ROUTINE HEALING, SUBSEQUENT ENCOUNTER: Primary | ICD-10-CM

## 2020-02-10 DIAGNOSIS — S52.601A CLOSED FRACTURE DISTAL RADIUS AND ULNA, RIGHT, INITIAL ENCOUNTER: ICD-10-CM

## 2020-02-10 DIAGNOSIS — S52.601D CLOSED FRACTURE OF DISTAL ENDS OF RIGHT RADIUS AND ULNA WITH ROUTINE HEALING, SUBSEQUENT ENCOUNTER: Primary | ICD-10-CM

## 2020-02-10 DIAGNOSIS — S52.501A CLOSED FRACTURE DISTAL RADIUS AND ULNA, RIGHT, INITIAL ENCOUNTER: ICD-10-CM

## 2020-02-10 PROCEDURE — 97110 THERAPEUTIC EXERCISES: CPT

## 2020-02-10 PROCEDURE — 97140 MANUAL THERAPY 1/> REGIONS: CPT

## 2020-02-10 RX ORDER — OXYCODONE HYDROCHLORIDE 5 MG/1
TABLET ORAL
Qty: 20 TABLET | Refills: 0 | Status: SHIPPED | OUTPATIENT
Start: 2020-02-10

## 2020-02-10 NOTE — TELEPHONE ENCOUNTER
Patient states she needs refill on her oxycodone  Please advise, patient can be reached at [phone]   Thank you,

## 2020-02-10 NOTE — PROGRESS NOTES
Daily Note     Today's date: 2/10/2020  Patient name: Melia Brown  : 1960  MRN: [id number]  Referring provider: Delaney Saleem MD  Dx:   Encounter Diagnosis     ICD-10-CM    1  Closed fracture of distal ends of right radius and ulna with routine healing, subsequent encounter S52 501D     S52 601D                   Subjective: Pt reported she was unable to tolerate custom cockup splint, went back into pre-aniya cock up with cotton sleeve underneath which has made a difference  Able to touch tip of thumb to pinky now  Objective: See treatment diary below      Assessment: Tolerated treatment well  Patient exhibited good technique with therapeutic exercises and would benefit from continued PT Initiated program, pt tolerated well  Pt had increased pain with AAROM moving into both wrist ext/flexion  Got relief from long flexor stretch  Plan: Continue per plan of care        Precautions: DOI 20; DOS 20  LATEX AND ADHESIVE TAPE ALLERGY  OA, HTN, PTSD, ovarian CA      Manual  /10            Retromassage/scar massage 5            AAROM 8            Long flexor stretch 2                          15                Exercise Diary  10            Towel bunches 2 min            Wrist AROM With cone 15x            Forearm AROM 15x            Pegs grasping NP            Nuts/bolts 4 min                                                                                                                                                           HEP: digit, wrist, forearm AROM                                        15                Modalities  2/10            MH 10 min            CP PRN 10 min

## 2020-02-13 ENCOUNTER — OFFICE VISIT (OUTPATIENT)
Dept: PHYSICAL THERAPY | Facility: MEDICAL CENTER | Age: 60
End: 2020-02-13
Payer: COMMERCIAL

## 2020-02-13 DIAGNOSIS — S52.601D CLOSED FRACTURE OF DISTAL ENDS OF RIGHT RADIUS AND ULNA WITH ROUTINE HEALING, SUBSEQUENT ENCOUNTER: Primary | ICD-10-CM

## 2020-02-13 DIAGNOSIS — S52.501D CLOSED FRACTURE OF DISTAL ENDS OF RIGHT RADIUS AND ULNA WITH ROUTINE HEALING, SUBSEQUENT ENCOUNTER: Primary | ICD-10-CM

## 2020-02-13 PROCEDURE — 97140 MANUAL THERAPY 1/> REGIONS: CPT

## 2020-02-13 NOTE — PROGRESS NOTES
Daily Note     Today's date: 2020  Patient name: Duane Feathers  : 1960  MRN: [id number]  Referring provider: Angela Moody MD  Dx:   Encounter Diagnosis     ICD-10-CM    1  Closed fracture of distal ends of right radius and ulna with routine healing, subsequent encounter S52 501D     S52 603U                   Subjective: Pt reports she has severe pain today  Thinks it may be related to weather      Objective: See treatment diary below      Assessment: Tolerated treatment well  Patient exhibited good technique with therapeutic exercises and would benefit from continued PT pt had increased pain with wrist ext/flex AAROM; tolerated sup/pron and long flexor stretch well  Plan: Continue per plan of care        Precautions: DOI 20; DOS 20  LATEX AND ADHESIVE TAPE ALLERGY  OA, HTN, PTSD, ovarian CA      Manual  2/10 2/13           Retromassage/scar massage 5 5           AAROM 8 10           Long flexor stretch 2 2                         15 17               Exercise Diary  2/10 2/13           Towel bunches 2 min 2 min           Wrist AROM With cone 15x            Forearm AROM 15x            Pegs grasping NP            Nuts/bolts 4 min            Ball roll  2 min                                                                                                                                             HEP: digit, wrist, forearm AROM                                        15                Modalities  2/10 2/13           MH 10 min 10 min           CP PRN 10 min 10 min

## 2020-02-17 ENCOUNTER — APPOINTMENT (OUTPATIENT)
Dept: PHYSICAL THERAPY | Facility: MEDICAL CENTER | Age: 60
End: 2020-02-17
Payer: COMMERCIAL

## 2020-02-20 ENCOUNTER — OFFICE VISIT (OUTPATIENT)
Dept: PHYSICAL THERAPY | Facility: MEDICAL CENTER | Age: 60
End: 2020-02-20
Payer: COMMERCIAL

## 2020-02-20 DIAGNOSIS — S52.501D CLOSED FRACTURE OF DISTAL ENDS OF RIGHT RADIUS AND ULNA WITH ROUTINE HEALING, SUBSEQUENT ENCOUNTER: Primary | ICD-10-CM

## 2020-02-20 DIAGNOSIS — S52.601D CLOSED FRACTURE OF DISTAL ENDS OF RIGHT RADIUS AND ULNA WITH ROUTINE HEALING, SUBSEQUENT ENCOUNTER: Primary | ICD-10-CM

## 2020-02-20 PROCEDURE — 97010 HOT OR COLD PACKS THERAPY: CPT

## 2020-02-20 PROCEDURE — 97140 MANUAL THERAPY 1/> REGIONS: CPT

## 2020-02-20 NOTE — PROGRESS NOTES
Daily Note     Today's date: 2020  Patient name: Nemo Woodruff  : 1960  MRN: [id number]  Referring provider: Manuela Zamarripa MD  Dx:   Encounter Diagnosis     ICD-10-CM    1  Closed fracture of distal ends of right radius and ulna with routine healing, subsequent encounter S52 501D     S52 601D                   Subjective: Pt reports she is still having significant pain into her wrist  Has been doing her exercises regularly  Objective: See treatment diary below      Assessment: Tolerated treatment well  Patient exhibited good technique with therapeutic exercises and would benefit from continued PT Pt still very limited in her wrist ext/flex with significant pain at end ranges  She does present with decreased edema since last visit  Demonstrated stretches he can now add to her program ( I e  Wrist ext/flex prayer stretch)      Plan: Continue per plan of care        Precautions: DOI 20; DOS 20  LATEX AND ADHESIVE TAPE ALLERGY  OA, HTN, PTSD, ovarian CA      Manual  2/10 2/13 2/20          Retromassage/scar massage 5 5 5          AAROM 8 10 8          Long flexor stretch 2 2 2                        15  15              Exercise Diary  2/10 2/13 2/20          Towel bunches 2 min 2 min 2 min          Wrist AROM With cone 15x  15x each          Forearm AROM 15x            Pegs grasping NP  3 min          Nuts/bolts 4 min            Ball roll  2 min 2 min                                                                                                                                            HEP: digit, wrist, forearm AROM                                        15  10 min uncarged              Modalities  2/10 2/13 2/20          MH 10 min 10 min 10 min          CP PRN 10 min 10 min NP

## 2020-02-24 ENCOUNTER — APPOINTMENT (OUTPATIENT)
Dept: PHYSICAL THERAPY | Facility: MEDICAL CENTER | Age: 60
End: 2020-02-24
Payer: COMMERCIAL

## 2020-02-27 ENCOUNTER — APPOINTMENT (OUTPATIENT)
Dept: PHYSICAL THERAPY | Facility: MEDICAL CENTER | Age: 60
End: 2020-02-27
Payer: COMMERCIAL

## 2020-03-02 ENCOUNTER — OFFICE VISIT (OUTPATIENT)
Dept: PHYSICAL THERAPY | Facility: MEDICAL CENTER | Age: 60
End: 2020-03-02
Payer: COMMERCIAL

## 2020-03-02 DIAGNOSIS — S52.601D CLOSED FRACTURE OF DISTAL ENDS OF RIGHT RADIUS AND ULNA WITH ROUTINE HEALING, SUBSEQUENT ENCOUNTER: Primary | ICD-10-CM

## 2020-03-02 DIAGNOSIS — S52.501D CLOSED FRACTURE OF DISTAL ENDS OF RIGHT RADIUS AND ULNA WITH ROUTINE HEALING, SUBSEQUENT ENCOUNTER: Primary | ICD-10-CM

## 2020-03-02 PROCEDURE — 97140 MANUAL THERAPY 1/> REGIONS: CPT

## 2020-03-02 NOTE — PROGRESS NOTES
Daily Note     Today's date: 3/2/2020  Patient name: Raquel Renteria  : 1960  MRN: [id number]  Referring provider: Javi Hassan MD  Dx:   Encounter Diagnosis     ICD-10-CM    1  Closed fracture of distal ends of right radius and ulna with routine healing, subsequent encounter S52 501D     S52 601D                   Subjective: Pt reports she is using her hand and wrist more for light activities at home  Wearing brace during activities  Has been working on passive stretching at home  Objective: See treatment diary below      Assessment: Tolerated treatment well  Patient exhibited good technique with therapeutic exercises and would benefit from continued PT  Pt had to leave early to catch her transportation  Added grooved pegs for FMS- Pt performed well  Pt still has moderate edema to her R wrist with firm end feel at end ranges  decreased long flexor tightness  AROM ext/flex 25/40  Sup 60  PROM 45/55  Sup 70        Plan: Continue per plan of care        Precautions: DOI 20; DOS 20  LATEX AND ADHESIVE TAPE ALLERGY  OA, HTN, PTSD, ovarian CA  RTD 3/5/20      Manual  2/10 2/13 2/20 3/2         Retromassage/scar massage 5 5 5 5         AAROM 8 10 8 10         Long flexor stretch 2 2 2 reassessment 5                       15 17 15 20             Exercise Diary  2/10 2/13 2/20 3/2         Towel bunches 2 min 2 min 2 min 2 min         Wrist AROM With cone 15x  15x each 15x each         Forearm AROM 15x            Pegs grasping NP  3 min          Nuts/bolts 4 min            Ball roll  2 min 2 min 2 min         Grooved pegs    1 board                                                                                                                              HEP: digit, wrist, forearm AROM                                        15  10 min uncarged 10 min uncharged             Modalities  2/10 2/13 2/20 3/2         MH 10 min 10 min 10 min 10 min         CP PRN 10 min 10 min NP

## 2020-03-05 ENCOUNTER — OFFICE VISIT (OUTPATIENT)
Dept: PHYSICAL THERAPY | Facility: MEDICAL CENTER | Age: 60
End: 2020-03-05
Payer: COMMERCIAL

## 2020-03-05 ENCOUNTER — OFFICE VISIT (OUTPATIENT)
Dept: OBGYN CLINIC | Facility: MEDICAL CENTER | Age: 60
End: 2020-03-05

## 2020-03-05 ENCOUNTER — APPOINTMENT (OUTPATIENT)
Dept: RADIOLOGY | Facility: MEDICAL CENTER | Age: 60
End: 2020-03-05
Payer: COMMERCIAL

## 2020-03-05 VITALS
SYSTOLIC BLOOD PRESSURE: 134 MMHG | BODY MASS INDEX: 35.99 KG/M2 | DIASTOLIC BLOOD PRESSURE: 78 MMHG | HEART RATE: 92 BPM | HEIGHT: 69 IN | WEIGHT: 243 LBS

## 2020-03-05 DIAGNOSIS — Z87.81 S/P ORIF (OPEN REDUCTION INTERNAL FIXATION) FRACTURE: ICD-10-CM

## 2020-03-05 DIAGNOSIS — Z98.890 S/P ORIF (OPEN REDUCTION INTERNAL FIXATION) FRACTURE: ICD-10-CM

## 2020-03-05 DIAGNOSIS — S52.501A CLOSED FRACTURE DISTAL RADIUS AND ULNA, RIGHT, INITIAL ENCOUNTER: ICD-10-CM

## 2020-03-05 DIAGNOSIS — S52.601A CLOSED FRACTURE DISTAL RADIUS AND ULNA, RIGHT, INITIAL ENCOUNTER: ICD-10-CM

## 2020-03-05 DIAGNOSIS — S52.601D CLOSED FRACTURE DISTAL RADIUS AND ULNA, RIGHT, WITH ROUTINE HEALING, SUBSEQUENT ENCOUNTER: Primary | ICD-10-CM

## 2020-03-05 DIAGNOSIS — S52.501D CLOSED FRACTURE OF DISTAL ENDS OF RIGHT RADIUS AND ULNA WITH ROUTINE HEALING, SUBSEQUENT ENCOUNTER: Primary | ICD-10-CM

## 2020-03-05 DIAGNOSIS — S52.501D CLOSED FRACTURE DISTAL RADIUS AND ULNA, RIGHT, WITH ROUTINE HEALING, SUBSEQUENT ENCOUNTER: Primary | ICD-10-CM

## 2020-03-05 DIAGNOSIS — S52.601D CLOSED FRACTURE OF DISTAL ENDS OF RIGHT RADIUS AND ULNA WITH ROUTINE HEALING, SUBSEQUENT ENCOUNTER: Primary | ICD-10-CM

## 2020-03-05 PROCEDURE — 99024 POSTOP FOLLOW-UP VISIT: CPT | Performed by: ORTHOPAEDIC SURGERY

## 2020-03-05 PROCEDURE — 97110 THERAPEUTIC EXERCISES: CPT

## 2020-03-05 PROCEDURE — 73110 X-RAY EXAM OF WRIST: CPT

## 2020-03-05 PROCEDURE — 97140 MANUAL THERAPY 1/> REGIONS: CPT

## 2020-03-05 NOTE — PROGRESS NOTES
Daily Note     Today's date: 3/5/2020  Patient name: Angela Betancur  : 1960  MRN: [id number]  Referring provider: Flor Boyer MD  Dx:   Encounter Diagnosis     ICD-10-CM    1  Closed fracture of distal ends of right radius and ulna with routine healing, subsequent encounter S52 501D     S52 601D                   Subjective: Pt saw Dr, happy with progress, wants her to wean off of splint  Objective: See treatment diary below      Assessment: Tolerated treatment well  Patient exhibited good technique with therapeutic exercises and would benefit from continued PT   ROM stable, most lmite dis wrist ext/flex, pain mostly at ext end range  added light strengthening to program pt tolerated well, mild soreness after  CP after treatment helped ease symptoms  Plan: Continue per plan of care        Precautions: DOI 20; DOS 20  LATEX AND ADHESIVE TAPE ALLERGY  OA, HTN, PTSD, ovarian CA  RTD 3/5/20      Manual  2/10 2/13 2/20 3/2 3/5        Retromassage/scar massage 5 5 5 5 5        AAROM 8 10 8 10 10        Long flexor stretch 2 2 2 reassessment 5                       15  15  15            Exercise Diary  2/10 2/13 2/20 3/2 3/5        Towel bunches 2 min 2 min 2 min 2 min 2 min        Wrist AROM With cone 15x  15x each 15x each 1# 20x each        Forearm AROM 15x            Pegs grasping NP  3 min          Nuts/bolts 4 min            Ball roll  2 min 2 min 2 min 2 min        Grooved pegs    1 board         fingerweb digit flexion     yellow 20x        Putty press     yellow 3 min                                                                                                   HEP: digit, wrist, forearm AROM                                        15  10 min uncarged 10 min uncharged 15            Modalities  2/10 2/13 2/20 3 3        MH 10 min 10 min 10 min 10 min 10 min        CP PRN 10 min 10 min NP

## 2020-03-05 NOTE — PROGRESS NOTES
Chief Complaint     S/o ORIF right distal radius       History of Present Illness     Татьяна Spears is a 64 y o  female status post open reduction internal fixation right distal radius fracture 1/17/20  She is attending physical therapy, using wrist brace  She is increasing use of extremity at home, dishes, folding laundry  She is also maintaining HEP as instructed  Denies any numbness or tingling in hand  Weather does bother her wrist            Past Medical History:   Diagnosis Date    Adopted person     Anemia     Anesthesia     "I cry"    Anxiety     Arthritis     back lower and knees    Bipolar 1 disorder (HCC)     bipolar 1    Cancer (HCC)     ovarian    Chronic pain disorder     Colon polyp     Depression     Edema     Hepatitis C     pt reports no tx "my body cured it"    History of bilateral knee replacement     History of cancer chemotherapy     History of pneumonia     "couple times"    History of radiation therapy     History of recent fall 01/04/2020    History of transfusion     possibly with hysterectomy and with vaginal delivery    Hyperlipidemia     Hypertension     "lately but not on meds"    Irritable bowel syndrome     Migraine     No natural teeth     Panic attacks     Poor vision     PTSD (post-traumatic stress disorder)     Risk for falls     Seizures ([facility] [address] )     last one "about 4 yrs ago" never on meds,, "possibly due to drug use"    Wears glasses     Wrist fracture     right and in a cast    Wrist pain, right     "constant"       Past Surgical History:   Procedure Laterality Date    APPENDECTOMY      COLONOSCOPY N/A 6/11/2018    Procedure: COLONOSCOPY;  Surgeon: Roxane Duffy MD;  Location: [facility];   Service: Gastroenterology    DENTAL SURGERY      DILATION AND CURETTAGE OF UTERUS      "many"    HYSTERECTOMY      JOINT REPLACEMENT Bilateral     knee    [place] OPEN RX DISTAL RADIUS FX, INTRA-ARTICULAR, 2 FRAG Right 1/17/2020    Procedure: ORIF DISTAL RADIUS FRACTURE;  Surgeon: Corrine Tabor MD;  Location: [facility];  Service: Orthopedics       Allergies   Allergen Reactions    Latex Rash     "Large blisters"    Other Rash     Adhesive tape, Natural rubber    "little bubbles"       Current Outpatient Medications on File Prior to Visit   Medication Sig Dispense Refill    buprenorphine (SUBUTEX) 8 mg Place under the tongue 2 (two) times a day 1 tab AM and 1/2 tab afternoon      lurasidone (LATUDA) 40 mg tablet Take 40 mg by mouth daily at bedtime       oxyCODONE (ROXICODONE) 5 mg immediate release tablet Take 1 tab q12H PRN pain 20 tablet 0    dicyclomine (BENTYL) 20 mg tablet Take 1 tablet (20 mg total) by mouth every 6 (six) hours for 30 days 120 tablet 1    ibuprofen (MOTRIN) 800 mg tablet Take 800 mg by mouth 3 (three) times a day as needed       No current facility-administered medications on file prior to visit  Social History     Tobacco Use    Smoking status: Current Every Day Smoker     Packs/day: 1 00     Years: 44 00     Pack years: 44 00     Types: Cigarettes    Smokeless tobacco: Never Used   Substance Use Topics    Alcohol use: Yes     Frequency: Monthly or less     Drinks per session: 1 or 2    Drug use: No     Comment: clean 1 5yrs       Family History   Problem Relation Age of Onset    No Known Problems Mother     No Known Problems Father        Review of Systems     As stated in the HPI  All other systems were reviewed and are negative  Physical Exam     /78   Pulse 92   Ht 5' 9" (1 753 m)   Wt 110 kg (243 lb)   BMI 35 88 kg/m²     GENERAL: This is a well-developed, well-nourished, age-appropriate patient in no acute distress  The patient is alert and oriented x3  Pleasant and cooperative  Eyes: Anicteric sclerae  Extraocular movements appear intact  HENT: Nares are patent with no drainage  Lungs: There is equal chest rise on inspection  Breathing is non-labored with no audible wheezing    Cardiovascular: No cyanosis  No upper extremity lymphadema  Skin: Skin is warm to touch  No obvious skin lesions or rashes other than described below  Neurologic: No ataxia  Psychiatric: Mood and affect are appropriate  Right wrist:  Well healed incision dorsum of wrist  No erythema, no ecchymosis  Decreased wrist extension 10  and flexion 30  TTP over distal radius   Able to make composite fist, able to oppose all fingers to thumb  Sensation intact to light touch median radial and ulnar nerves  5/5 Motor to the APB, FDI, FDP2, FDP5, EDC  Sensation intact to light touch in the median, radial, and ulnar nerve distribution  Data Review     Results Reviewed     None             Imaging: Three-view radiographs of the right wrist taken in the office and personally reviewed by me today shows evidence of stable alignment of internal fixation with interval healing at the fracture    Assessment and Plan      Diagnoses and all orders for this visit:    Closed fracture distal radius and ulna, right, with routine healing, subsequent encounter  -     XR wrist 3+ vw right; Future    S/P ORIF (open reduction internal fixation) fracture           S/p ORIF distal radius 1/17/20 overall doing well  Imaging reviewed today shows stable screw and plate fixation with continued bony healing  She can d/c wrist brace  She can continue to progress her activity level at home  Continue with therapy progressing motion  Due to nature of fracture she may not regain full motion of wrist    See back in 6 weeks         Follow Up: 6 weeks     To Do Next Visit: therapy, d/c brace     PROCEDURES PERFORMED:  Procedures  No Procedures performed today         Scribe Attestation    I,:   Olimpia Ortega am acting as a scribe while in the presence of the attending physician :        I,:   Dominic Vazquez MD personally performed the services described in this documentation    as scribed in my presence :

## 2020-03-08 ENCOUNTER — HOSPITAL ENCOUNTER (EMERGENCY)
Facility: HOSPITAL | Age: 60
Discharge: HOME | End: 2020-03-08
Attending: EMERGENCY MEDICINE | Admitting: EMERGENCY MEDICINE
Payer: COMMERCIAL

## 2020-03-08 ENCOUNTER — APPOINTMENT (EMERGENCY)
Dept: RADIOLOGY | Facility: HOSPITAL | Age: 60
End: 2020-03-08
Payer: COMMERCIAL

## 2020-03-08 ENCOUNTER — APPOINTMENT (EMERGENCY)
Dept: NON INVASIVE DIAGNOSTICS | Facility: HOSPITAL | Age: 60
End: 2020-03-08
Payer: COMMERCIAL

## 2020-03-08 VITALS
BODY MASS INDEX: 35.49 KG/M2 | WEIGHT: 240.3 LBS | RESPIRATION RATE: 18 BRPM | OXYGEN SATURATION: 99 % | TEMPERATURE: 97.2 F | DIASTOLIC BLOOD PRESSURE: 93 MMHG | SYSTOLIC BLOOD PRESSURE: 162 MMHG | HEART RATE: 86 BPM

## 2020-03-08 DIAGNOSIS — J06.9 URI (UPPER RESPIRATORY INFECTION): ICD-10-CM

## 2020-03-08 DIAGNOSIS — R60.9 CHRONIC EDEMA: ICD-10-CM

## 2020-03-08 DIAGNOSIS — M79.89 LEG SWELLING: ICD-10-CM

## 2020-03-08 DIAGNOSIS — L03.115 BILATERAL CELLULITIS OF LOWER LEG: Primary | ICD-10-CM

## 2020-03-08 DIAGNOSIS — L03.116 BILATERAL CELLULITIS OF LOWER LEG: Primary | ICD-10-CM

## 2020-03-08 LAB
ALBUMIN SERPL BCP-MCNC: 3.9 G/DL (ref 3–5.2)
ALP SERPL-CCNC: 70 U/L (ref 43–122)
ALT SERPL W P-5'-P-CCNC: 26 U/L (ref 9–52)
ANION GAP SERPL CALCULATED.3IONS-SCNC: 3 MMOL/L (ref 5–14)
AST SERPL W P-5'-P-CCNC: 21 U/L (ref 14–36)
BASOPHILS # BLD AUTO: 0.1 THOUSANDS/ΜL (ref 0–0.1)
BASOPHILS NFR BLD AUTO: 1 % (ref 0–1)
BILIRUB SERPL-MCNC: 0.5 MG/DL
BUN SERPL-MCNC: 8 MG/DL (ref 5–25)
CALCIUM SERPL-MCNC: 9.2 MG/DL (ref 8.4–10.2)
CHLORIDE SERPL-SCNC: 107 MMOL/L (ref 97–108)
CO2 SERPL-SCNC: 28 MMOL/L (ref 22–30)
CREAT SERPL-MCNC: 0.52 MG/DL (ref 0.6–1.2)
D DIMER PPP FEU-MCNC: 0.6 UG/ML FEU
EOSINOPHIL # BLD AUTO: 0.1 THOUSAND/ΜL (ref 0–0.4)
EOSINOPHIL NFR BLD AUTO: 2 % (ref 0–6)
ERYTHROCYTE [DISTWIDTH] IN BLOOD BY AUTOMATED COUNT: 14.2 %
FLUAV RNA NPH QL NAA+PROBE: NORMAL
FLUBV RNA NPH QL NAA+PROBE: NORMAL
GFR SERPL CREATININE-BSD FRML MDRD: 106 ML/MIN/1.73SQ M
GLUCOSE SERPL-MCNC: 108 MG/DL (ref 70–99)
HCT VFR BLD AUTO: 41.4 % (ref 36–46)
HGB BLD-MCNC: 14 G/DL (ref 12–16)
LYMPHOCYTES # BLD AUTO: 1.8 THOUSANDS/ΜL (ref 0.5–4)
LYMPHOCYTES NFR BLD AUTO: 29 % (ref 25–45)
MCH RBC QN AUTO: 29.9 PG (ref 26–34)
MCHC RBC AUTO-ENTMCNC: 33.7 G/DL (ref 31–36)
MCV RBC AUTO: 89 FL (ref 80–100)
MONOCYTES # BLD AUTO: 0.4 THOUSAND/ΜL (ref 0.2–0.9)
MONOCYTES NFR BLD AUTO: 7 % (ref 1–10)
NEUTROPHILS # BLD AUTO: 3.7 THOUSANDS/ΜL (ref 1.8–7.8)
NEUTS SEG NFR BLD AUTO: 60 % (ref 45–65)
NT-PROBNP SERPL-MCNC: 427 PG/ML (ref 0–299)
PLATELET # BLD AUTO: 290 THOUSANDS/UL (ref 150–450)
PMV BLD AUTO: 7 FL (ref 8.9–12.7)
POTASSIUM SERPL-SCNC: 4.6 MMOL/L (ref 3.6–5)
PROT SERPL-MCNC: 7.1 G/DL (ref 5.9–8.4)
RBC # BLD AUTO: 4.68 MILLION/UL (ref 4–5.2)
RSV RNA NPH QL NAA+PROBE: NORMAL
SODIUM SERPL-SCNC: 138 MMOL/L (ref 137–147)
WBC # BLD AUTO: 6.2 THOUSAND/UL (ref 4.5–11)

## 2020-03-08 PROCEDURE — 71046 X-RAY EXAM CHEST 2 VIEWS: CPT

## 2020-03-08 PROCEDURE — 85379 FIBRIN DEGRADATION QUANT: CPT | Performed by: PHYSICIAN ASSISTANT

## 2020-03-08 PROCEDURE — 99284 EMERGENCY DEPT VISIT MOD MDM: CPT

## 2020-03-08 PROCEDURE — 87040 BLOOD CULTURE FOR BACTERIA: CPT | Performed by: PHYSICIAN ASSISTANT

## 2020-03-08 PROCEDURE — 87631 RESP VIRUS 3-5 TARGETS: CPT | Performed by: PHYSICIAN ASSISTANT

## 2020-03-08 PROCEDURE — 99285 EMERGENCY DEPT VISIT HI MDM: CPT | Performed by: PHYSICIAN ASSISTANT

## 2020-03-08 PROCEDURE — 80053 COMPREHEN METABOLIC PANEL: CPT | Performed by: PHYSICIAN ASSISTANT

## 2020-03-08 PROCEDURE — 83880 ASSAY OF NATRIURETIC PEPTIDE: CPT | Performed by: PHYSICIAN ASSISTANT

## 2020-03-08 PROCEDURE — 93970 EXTREMITY STUDY: CPT

## 2020-03-08 PROCEDURE — 85025 COMPLETE CBC W/AUTO DIFF WBC: CPT | Performed by: PHYSICIAN ASSISTANT

## 2020-03-08 PROCEDURE — 36415 COLL VENOUS BLD VENIPUNCTURE: CPT | Performed by: PHYSICIAN ASSISTANT

## 2020-03-08 RX ORDER — ACETAMINOPHEN 500 MG
500 TABLET ORAL EVERY 6 HOURS PRN
Qty: 30 TABLET | Refills: 0 | Status: SHIPPED | OUTPATIENT
Start: 2020-03-08

## 2020-03-08 RX ORDER — CEPHALEXIN 500 MG/1
500 CAPSULE ORAL EVERY 6 HOURS SCHEDULED
Qty: 30 CAPSULE | Refills: 0 | Status: SHIPPED | OUTPATIENT
Start: 2020-03-08 | End: 2020-03-18

## 2020-03-08 RX ORDER — SULFAMETHOXAZOLE AND TRIMETHOPRIM 800; 160 MG/1; MG/1
1 TABLET ORAL 2 TIMES DAILY
Qty: 14 TABLET | Refills: 0 | Status: SHIPPED | OUTPATIENT
Start: 2020-03-08 | End: 2020-03-15

## 2020-03-08 RX ORDER — IBUPROFEN 400 MG/1
400 TABLET ORAL EVERY 6 HOURS PRN
Qty: 20 TABLET | Refills: 0 | Status: SHIPPED | OUTPATIENT
Start: 2020-03-08

## 2020-03-08 NOTE — ED PROVIDER NOTES
History  Chief Complaint   Patient presents with    Chills     No fevers, some diarrhea   Cough     3 days with dry cough     Patient is a 19-year-old female who reports a past medical history significant for hypertension hyperlipidemia, edema on Lasix presenting for evaluation of bilateral lower leg swelling and redness which began 3 days ago and was also accompanied by a cough which is nonproductive in nature  Patient denies any chest pain or shortness of breath, abdominal pain, nausea, vomiting, diarrhea  Patient does endorse subjective fevers and chills associated with her symptoms  Patient denies any known traumatic inciting event for the lower leg swelling and redness  Patient reports she has been taking her normal dose of Lasix and reports that it is not alleviating her symptoms  History provided by:  Patient   used: No    Cough   Cough characteristics:  Non-productive  Sputum characteristics:  Nondescript  Severity:  Moderate  Onset quality:  Gradual  Duration:  3 days  Timing:  Intermittent  Progression:  Waxing and waning  Chronicity:  New  Relieved by:  None tried  Worsened by:  Nothing  Ineffective treatments: lasix  Associated symptoms: rash    Associated symptoms: no chest pain, no chills, no ear pain, no fever, no rhinorrhea, no shortness of breath and no sore throat        Prior to Admission Medications   Prescriptions Last Dose Informant Patient Reported? Taking?    buprenorphine (SUBUTEX) 8 mg   Yes No   Sig: Place under the tongue 2 (two) times a day 1 tab AM and 1/2 tab afternoon   dicyclomine (BENTYL) 20 mg tablet   No No   Sig: Take 1 tablet (20 mg total) by mouth every 6 (six) hours for 30 days   ibuprofen (MOTRIN) 800 mg tablet   Yes No   Sig: Take 800 mg by mouth 3 (three) times a day as needed   lurasidone (LATUDA) 40 mg tablet   Yes No   Sig: Take 40 mg by mouth daily at bedtime    oxyCODONE (ROXICODONE) 5 mg immediate release tablet   No No   Sig: Take 1 tab q12H PRN pain      Facility-Administered Medications: None       Past Medical History:   Diagnosis Date    Adopted person     Anemia     Anesthesia     "I cry"    Anxiety     Arthritis     back lower and knees    Bipolar 1 disorder (HCC)     bipolar 1    Cancer (HCC)     ovarian    Chronic pain disorder     Colon polyp     Depression     Edema     Hepatitis C     pt reports no tx "my body cured it"    History of bilateral knee replacement     History of cancer chemotherapy     History of pneumonia     "couple times"    History of radiation therapy     History of recent fall 01/04/2020    History of transfusion     possibly with hysterectomy and with vaginal delivery    Hyperlipidemia     Hypertension     "lately but not on meds"    Irritable bowel syndrome     Migraine     No natural teeth     Panic attacks     Poor vision     PTSD (post-traumatic stress disorder)     Risk for falls     Seizures ([address] )     last one "about 4 yrs ago" never on meds,, "possibly due to drug use"    Wears glasses     Wrist fracture     right and in a cast    Wrist pain, right     "constant"       Past Surgical History:   Procedure Laterality Date    APPENDECTOMY      COLONOSCOPY N/A 6/11/2018    Procedure: COLONOSCOPY;  Surgeon: Uziel Wilkinson MD;  Location: [facility]; Service: Gastroenterology    DENTAL SURGERY      DILATION AND CURETTAGE OF UTERUS      "many"    HYSTERECTOMY      JOINT REPLACEMENT Bilateral     knee    CA OPEN RX DISTAL RADIUS FX, INTRA-ARTICULAR, 2 FRAG Right 1/17/2020    Procedure: ORIF DISTAL RADIUS FRACTURE;  Surgeon: Paz Rodriguez MD;  Location: [facility];  Service: Orthopedics       Family History   Problem Relation Age of Onset    No Known Problems Mother     No Known Problems Father      I have reviewed and agree with the history as documented      E-Cigarette/Vaping    E-Cigarette Use Never User      E-Cigarette/Vaping Substances     Social History Tobacco Use    Smoking status: Current Every Day Smoker     Packs/day: 1 00     Years: 44 00     Pack years: 44 00     Types: Cigarettes    Smokeless tobacco: Never Used   Substance Use Topics    Alcohol use: Yes     Frequency: Monthly or less     Drinks per session: 1 or 2    Drug use: No     Comment: clean 1 5yrs       Review of Systems   Constitutional: Negative for chills, fatigue and fever  HENT: Negative for congestion, ear pain, rhinorrhea and sore throat  Eyes: Negative for redness  Respiratory: Positive for cough  Negative for chest tightness and shortness of breath  Cardiovascular: Positive for leg swelling  Negative for chest pain and palpitations  Gastrointestinal: Negative for abdominal pain, nausea and vomiting  Genitourinary: Negative for dysuria and hematuria  Musculoskeletal: Negative  Skin: Positive for rash  Neurological: Negative for dizziness, syncope, light-headedness and numbness  Physical Exam  Physical Exam   Constitutional: She is oriented to person, place, and time  She appears well-developed and well-nourished  HENT:   Head: Normocephalic  Eyes: No scleral icterus  Cardiovascular: Normal rate and regular rhythm  Pulmonary/Chest: Effort normal and breath sounds normal  No stridor  Patient in no respiratory distress, speaking in full sentences, managing oral secretions without difficulty, no accessory muscle use, retractions, or belly breathing noted, no adventitious lung sounds auscultated bilaterally  Abdominal: Soft  She exhibits no distension  There is no tenderness  Musculoskeletal: Normal range of motion  She exhibits edema and tenderness  Neurological: She is alert and oriented to person, place, and time  Skin: Skin is warm and dry  Capillary refill takes less than 2 seconds  There is erythema  Bilateral lower extremity 2+ pitting edema  Erythema noted in bilateral anterior shin area    No weeping or sores or excoriations present  Psychiatric: She has a normal mood and affect  Nursing note and vitals reviewed        Vital Signs  ED Triage Vitals   Temperature Pulse Respirations Blood Pressure SpO2   03/08/20 1047 03/08/20 1047 03/08/20 1047 03/08/20 1047 03/08/20 1047   (!) 97 2 °F (36 2 °C) 77 18 (!) 172/94 98 %      Temp src Heart Rate Source Patient Position - Orthostatic VS BP Location FiO2 (%)   -- 03/08/20 1523 03/08/20 1047 03/08/20 1047 --    Monitor Sitting Left arm       Pain Score       03/08/20 1523       6           Vitals:    03/08/20 1047 03/08/20 1523   BP: (!) 172/94 162/93   Pulse: 77 86   Patient Position - Orthostatic VS: Sitting Lying         Visual Acuity      ED Medications  Medications - No data to display    Diagnostic Studies  Results Reviewed     Procedure Component Value Units Date/Time    Influenza A/B and RSV PCR [[id number]]  (Normal) Collected:  03/08/20 1125    Lab Status:  Final result Specimen:  Nasopharyngeal Swab Updated:  03/08/20 1212     INFLUENZA A PCR None Detected     INFLUENZA B PCR None Detected     RSV PCR None Detected    NT-BNP PRO [[id number]]  (Abnormal) Collected:  03/08/20 1123    Lab Status:  Final result Specimen:  Blood from Arm, Left Updated:  03/08/20 1201     NT-proBNP 427 pg/mL     D-Dimer [[id number]]  (Abnormal) Collected:  03/08/20 1123    Lab Status:  Final result Specimen:  Blood from Arm, Left Updated:  03/08/20 1151     D-Dimer, Quant 0 60 ug/ml FEU     Comprehensive metabolic panel [[id number]]  (Abnormal) Collected:  03/08/20 1123    Lab Status:  Final result Specimen:  Blood from Arm, Left Updated:  03/08/20 1151     Sodium 138 mmol/L      Potassium 4 6 mmol/L      Chloride 107 mmol/L      CO2 28 mmol/L      ANION GAP 3 mmol/L      BUN 8 mg/dL      Creatinine 0 52 mg/dL      Glucose 108 mg/dL      Calcium 9 2 mg/dL      AST 21 U/L      ALT 26 U/L      Alkaline Phosphatase 70 U/L      Total Protein 7 1 g/dL      Albumin 3 9 g/dL      Total Bilirubin 0 50 mg/dL eGFR 106 ml/min/1 73sq m     Narrative:       [place] guidelines for Chronic Kidney Disease (CKD):     Stage 1 with normal or high GFR (GFR > 90 mL/min/1 73 square meters)    Stage 2 Mild CKD (GFR = 60-89 mL/min/1 73 square meters)    Stage 3A Moderate CKD (GFR = 45-59 mL/min/1 73 square meters)    Stage 3B Moderate CKD (GFR = 30-44 mL/min/1 73 square meters)    Stage 4 Severe CKD (GFR = 15-29 mL/min/1 73 square meters)    Stage 5 End Stage CKD (GFR <15 mL/min/1 73 square meters)  Note: GFR calculation is accurate only with a steady state creatinine    Blood culture #2 [[id number]] Collected:  03/08/20 1140    Lab Status: In process Specimen:  Blood from Arm, Right Updated:  03/08/20 1145    CBC and differential [[id number]]  (Abnormal) Collected:  03/08/20 1123    Lab Status:  Final result Specimen:  Blood from Arm, Left Updated:  03/08/20 1143     WBC 6 20 Thousand/uL      RBC 4 68 Million/uL      Hemoglobin 14 0 g/dL      Hematocrit 41 4 %      MCV 89 fL      MCH 29 9 pg      MCHC 33 7 g/dL      RDW 14 2 %      MPV 7 0 fL      Platelets 156 Thousands/uL      Neutrophils Relative 60 %      Lymphocytes Relative 29 %      Monocytes Relative 7 %      Eosinophils Relative 2 %      Basophils Relative 1 %      Neutrophils Absolute 3 70 Thousands/µL      Lymphocytes Absolute 1 80 Thousands/µL      Monocytes Absolute 0 40 Thousand/µL      Eosinophils Absolute 0 10 Thousand/µL      Basophils Absolute 0 10 Thousands/µL     Blood culture #1 [[id number]] Collected:  03/08/20 1126    Lab Status:   In process Specimen:  Blood from Arm, Left Updated:  03/08/20 1130                 XR chest 2 views   ED Interpretation by Nehemias Neves PA-C (03/08 1521)   No acute abnormalities visualized      VAS lower limb venous duplex study, complete bilateral    (Results Pending)              Procedures  Procedures         ED Course  ED Course as of Mar 08 1527   Elian Georges Mar 08, 2020   1203 D-Dimer, Quant(!): 0 60   1507 Duplex negative  MDM  Number of Diagnoses or Management Options  Diagnosis management comments: 46-year-old female reports a history of hypertension hyperlipidemia and edema presents for evaluation of bilateral lower leg swelling and anterior erythema which began 3 days ago and is accompanied by a nonproductive cough  Patient denies chest pain or shortness of breath will obtain basic lab work as well as blood cultures and a D-dimer to evaluate for potential infectious verses coagulopathic source for swelling  All imaging and/or lab testing discussed with patient, strict return to ED precautions discussed  Patient and/or family members verbalizes understanding and agrees with plan  Patient is stable for discharge     Portions of the record may have been created with voice recognition software  Occasional wrong word or "sound a like" substitutions may have occurred due to the inherent limitations of voice recognition software  Read the chart carefully and recognize, using context, where substitutions have occurred  Disposition  Final diagnoses:   Bilateral cellulitis of lower leg   URI (upper respiratory infection)     Time reflects when diagnosis was documented in both MDM as applicable and the Disposition within this note     Time User Action Codes Description Comment    3/8/2020  3:06 PM Allen Morales Add [R60 9] Chronic edema     3/8/2020  3:06 PM Allen Morales Add [M79 89] Leg swelling     3/8/2020  3:09 PM June Sanchez - Shea aDvis [I14 002,  A46 870] Bilateral cellulitis of lower leg     3/8/2020  3:09 PM Simón Merchant Add [J06 9] URI (upper respiratory infection)       ED Disposition     ED Disposition Condition Date/Time Comment    Discharge Good Sun Mar 8, 2020  3:08 PM Татьяна Morales discharge to home/self care              Follow-up Information     Follow up With Specialties Details Why Contact Info    Evie Santos MD Family Medicine Schedule an appointment as soon as possible for a visit  As needed [address] [address]  [phone]            Patient's Medications   Discharge Prescriptions    ACETAMINOPHEN (TYLENOL) 500 MG TABLET    Take 1 tablet (500 mg total) by mouth every 6 (six) hours as needed for mild pain       Start Date: 3/8/2020  End Date: --       Order Dose: 500 mg       Quantity: 30 tablet    Refills: 0    CEPHALEXIN (KEFLEX) 500 MG CAPSULE    Take 1 capsule (500 mg total) by mouth every 6 (six) hours for 10 days       Start Date: 3/8/2020  End Date: 3/18/2020       Order Dose: 500 mg       Quantity: 30 capsule    Refills: 0    IBUPROFEN (MOTRIN) 400 MG TABLET    Take 1 tablet (400 mg total) by mouth every 6 (six) hours as needed for moderate pain       Start Date: 3/8/2020  End Date: --       Order Dose: 400 mg       Quantity: 20 tablet    Refills: 0    SULFAMETHOXAZOLE-TRIMETHOPRIM (BACTRIM DS) 800-160 MG PER TABLET    Take 1 tablet by mouth 2 (two) times a day for 7 days smx-tmp DS (BACTRIM) 800-160 mg tabs (1tab q12 D10)       Start Date: 3/8/2020  End Date: 3/15/2020       Order Dose: 1 tablet       Quantity: 14 tablet    Refills: 0     No discharge procedures on file      PDMP Review     None          ED Provider  Electronically Signed by           Saturnino Tomas PA-C  03/08/20 8544

## 2020-03-08 NOTE — ED NOTES
Supervisor called again to see where venous duplex is - will call again      Radha Zhou RN  03/08/20 4298

## 2020-03-09 PROCEDURE — 93970 EXTREMITY STUDY: CPT | Performed by: SURGERY

## 2020-03-12 ENCOUNTER — OFFICE VISIT (OUTPATIENT)
Dept: PHYSICAL THERAPY | Facility: MEDICAL CENTER | Age: 60
End: 2020-03-12
Payer: COMMERCIAL

## 2020-03-12 DIAGNOSIS — S52.501D CLOSED FRACTURE OF DISTAL ENDS OF RIGHT RADIUS AND ULNA WITH ROUTINE HEALING, SUBSEQUENT ENCOUNTER: Primary | ICD-10-CM

## 2020-03-12 DIAGNOSIS — S52.601D CLOSED FRACTURE OF DISTAL ENDS OF RIGHT RADIUS AND ULNA WITH ROUTINE HEALING, SUBSEQUENT ENCOUNTER: Primary | ICD-10-CM

## 2020-03-12 PROCEDURE — 97140 MANUAL THERAPY 1/> REGIONS: CPT

## 2020-03-12 NOTE — PROGRESS NOTES
Daily Note   20: pt was contacted several times regarding follow up through  virtual telehealth visits but she has not responded  D/C at this time    Today's date: 3/12/2020  Patient name: Nemo Woodruff  : 1960  MRN: [id number]  Referring provider: Manuela Zamarripa MD  Dx:   Encounter Diagnosis     ICD-10-CM    1  Closed fracture of distal ends of right radius and ulna with routine healing, subsequent encounter S52 501D     S52 601D                   Subjective: Has gone a whole day without wearing brace, a little sore  Able to do more dishes, still has difficulty cleaning pots  Difficulty handling coffee pot  Objective: See treatment diary below      Assessment: Tolerated treatment well  Patient exhibited good technique with therapeutic exercises and would benefit from continued PT Pt still has moderate limitation in wrist extension ROM, flexion and supination gradually improving  Pt still has severe pain at end range extension  Pt had to leave early to catch her transportation  Plan: Continue per plan of care        Precautions: DOI 20; DOS 20  LATEX AND ADHESIVE TAPE ALLERGY  OA, HTN, PTSD, ovarian CA  RTD 3/5/20      Manual  2/10 2/13 2/20 3/2 3/5 3/12       Retromassage/scar massage 5 5 5 5 5 5       AAROM 8 10 8 10 10 10       Long flexor stretch 2 2 2 reassessment 5                       15 17 15 20 15 15           Exercise Diary  2/10 2/13 2/20 3/2 3/5 3/12       Towel bunches 2 min 2 min 2 min 2 min 2 min 2 min       Wrist AROM With cone 15x  15x each 15x each 1# 20x each        Forearm AROM 15x            Pegs grasping NP  3 min          Nuts/bolts 4 min            Ball roll  2 min 2 min 2 min 2 min 2 min       Grooved pegs    1 board         fingerweb digit flexion     yellow 20x        Putty press     yellow 3 min                                                                                                   HEP: digit, wrist, forearm AROM 15  10 min uncarged 10 min uncharged 15 5           Modalities  2/10 2/13 2/20 3/2 3/5 3/12       MH 10 min 10 min 10 min 10 min 10 min 10 min       CP PRN 10 min 10 min NP

## 2020-03-14 LAB
BACTERIA BLD CULT: NORMAL
BACTERIA BLD CULT: NORMAL

## 2020-03-16 ENCOUNTER — APPOINTMENT (OUTPATIENT)
Dept: PHYSICAL THERAPY | Facility: MEDICAL CENTER | Age: 60
End: 2020-03-16
Payer: COMMERCIAL

## 2023-04-13 ENCOUNTER — HOSPITAL ENCOUNTER (EMERGENCY)
Facility: HOSPITAL | Age: 63
Discharge: HOME | End: 2023-04-13
Attending: EMERGENCY MEDICINE | Admitting: EMERGENCY MEDICINE

## 2023-04-13 ENCOUNTER — APPOINTMENT (EMERGENCY)
Dept: RADIOLOGY | Facility: HOSPITAL | Age: 63
End: 2023-04-13

## 2023-04-13 VITALS
DIASTOLIC BLOOD PRESSURE: 58 MMHG | RESPIRATION RATE: 17 BRPM | OXYGEN SATURATION: 98 % | SYSTOLIC BLOOD PRESSURE: 121 MMHG | WEIGHT: 169 LBS | TEMPERATURE: 97.1 F | BODY MASS INDEX: 25.03 KG/M2 | HEIGHT: 69 IN | HEART RATE: 64 BPM

## 2023-04-13 DIAGNOSIS — R51.9 HEADACHE: Primary | ICD-10-CM

## 2023-04-13 RX ORDER — KETOROLAC TROMETHAMINE 30 MG/ML
15 INJECTION, SOLUTION INTRAMUSCULAR; INTRAVENOUS ONCE
Status: DISCONTINUED | OUTPATIENT
Start: 2023-04-13 | End: 2023-04-13 | Stop reason: HOSPADM

## 2023-04-13 RX ORDER — METOCLOPRAMIDE HYDROCHLORIDE 5 MG/ML
10 INJECTION INTRAMUSCULAR; INTRAVENOUS ONCE
Status: COMPLETED | OUTPATIENT
Start: 2023-04-13 | End: 2023-04-13

## 2023-04-13 RX ORDER — ACETAMINOPHEN 325 MG/1
975 TABLET ORAL ONCE
Status: COMPLETED | OUTPATIENT
Start: 2023-04-13 | End: 2023-04-13

## 2023-04-13 RX ADMIN — ACETAMINOPHEN 975 MG: 325 TABLET ORAL at 14:18

## 2023-04-13 RX ADMIN — SODIUM CHLORIDE 1000 ML: 0.9 INJECTION, SOLUTION INTRAVENOUS at 14:17

## 2023-04-13 RX ADMIN — METOCLOPRAMIDE HYDROCHLORIDE 10 MG: 5 INJECTION INTRAMUSCULAR; INTRAVENOUS at 14:18

## 2023-04-13 NOTE — ED ATTENDING ATTESTATION
4/13/2023  IRobert DO, saw and evaluated the patient  I have discussed the patient with the resident/non-physician practitioner and agree with the resident's/non-physician practitioner's findings, Plan of Care, and MDM as documented in the resident's/non-physician practitioner's note, except where noted  All available labs and Radiology studies were reviewed  I was present for key portions of any procedure(s) performed by the resident/non-physician practitioner and I was immediately available to provide assistance  At this point I agree with the current assessment done in the Emergency Department  I have conducted an independent evaluation of this patient a history and physical is as follows:    Patient is a 45-year-old female history of chronic migraine headaches, tobacco use, says that 6 days ago she began with a general headache, photophobia, phonophobia some nausea, felt similar to prior migraines  Yesterday began involving the area behind the left eye  No blurred vision or double vision  She tried taking Excedrin Migraine, ibuprofen, Tylenol with mild but not complete relief  No one else sick with similar headaches at home, no recent head or neck infections or head or neck surgeries  Does a last week she tripped walking down the steps at a friend's house, did not hit her head or suffer significant trauma from that trip and fall  She did have 1 episode of nonbloody, non-mucousy diarrhea yesterday, 1 again today  No travel history, no sick contacts, recent hospitalization or antibiotics  No abdominal pain      General:  Patient is well-appearing  Head:  Atraumatic  Eyes:  Conjunctiva pink, Extraocular muscle intact, no periorbital ecchymosis, PERRL  ENT:  Mucous membranes are moist, no dental malocclusion, no craniofacial instability, no Diaz signs, no temporal artery tenderness  Neck:  No midline tenderness or step-offs or deformities  Cardiac:  S1-S2, without murmurs, no chest wall tenderness  Lungs:  Clear to auscultation bilaterally  Abdomen:  Soft, nontender, normal bowel sounds, no CVA tenderness, no tympany, no rigidity, no guarding  Extremities:  No bony tenderness to the bilateral bilateral humeral heads, humerus, elbows, radius, ulna, hands, hips, femurs, knees, tibia, fibula, feet  No pain with passive range of motion at the bilateral shoulders, elbows, wrists, hips, knees, or ankles  Back: No midline thoracic, lumbar, sacral tenderness, deformities, or step-offs  Neurologic:  Awake, fluent speech, normal comprehension, AAOx3, No deficit on finger to nose testing, no pronator drift, cranial nerves II through XII are intact, no facial droop, no slurred speech, normal sensation, strength 5/5 in b/l upper extremities, 4 out of 5 in the left and right leg although the right leg is just slightly weaker than the left  She says this is normal for her  Skin:  Pink warm and dry  Psychiatric:  Alert, pleasant, cooperative      ED Course     At this point I suspect the patient most likely has a migraine headache  Given her recent fall, CT scan of the head is currently pending to evaluate the possibility of intracranial hemorrhage  She has no temporal artery tenderness or blurred vision, do not believe this represents giant cell arteritis  If her head CT is unremarkable and she is feeling more comfortable, would consider discharge patient home  Signed out to Dr Brunilda Lerma at [facility]    The differential diagnosis before testing included (but is not limited to) acute nonspecific headache, acute viral illness and acute intracranial hemorrhage which is a medical condition that poses a threat to life/function  Chronic conditions affecting care:  As per HPI                Critical Care Time  Procedures

## 2023-04-13 NOTE — ED PROVIDER NOTES
History  Chief Complaint   Patient presents with   • Headache     Began with headache on Friday and has progressively gotten worse, now c/o of pain behind left eye  Also has nausea and episode of diarrhea yesterday  HPI     61-year-old female with past medical history of bipolar disorder and migraines who presents for evaluation of a headache  Patient states she has been having a headache for the past week  She states pain was initially in the top of her head and now spread towards her forehead  Today the pain went behind her left eye  She is trying tylenol and excerdrin with only mild relief of symptoms  Patient states she has had migraine headaches in the past but typically do not last this long  She states she has had Botox in the past for her migraines which reduce the frequency so she does not take any daily medications for migraines  Patient states she has had nausea but no vomiting  She is also been having multiple episodes of diarrhea  Denies fevers or chills  Denies blurred or double vision, dysphagia, dysarthria, ataxia, vertigo, or new numbness/weakness in her extremities  She states she did have a fall last week where she fell down a few stairs and landed on her buttocks  She denies any head strike with this  She is not on any blood thinners  Prior to Admission Medications   Prescriptions Last Dose Informant Patient Reported?  Taking?   acetaminophen (TYLENOL) 500 mg tablet   No No   Sig: Take 1 tablet (500 mg total) by mouth every 6 (six) hours as needed for mild pain   buprenorphine (SUBUTEX) 8 mg   Yes No   Sig: Place under the tongue 2 (two) times a day 1 tab AM and 1/2 tab afternoon   dicyclomine (BENTYL) 20 mg tablet   No No   Sig: Take 1 tablet (20 mg total) by mouth every 6 (six) hours for 30 days   ibuprofen (MOTRIN) 400 mg tablet   No No   Sig: Take 1 tablet (400 mg total) by mouth every 6 (six) hours as needed for moderate pain   ibuprofen (MOTRIN) 800 mg tablet   Yes No "  Sig: Take 800 mg by mouth 3 (three) times a day as needed   lurasidone (LATUDA) 40 mg tablet   Yes No   Sig: Take 40 mg by mouth daily at bedtime    oxyCODONE (ROXICODONE) 5 mg immediate release tablet   No No   Sig: Take 1 tab q12H PRN pain      Facility-Administered Medications: None       Past Medical History:   Diagnosis Date   • Adopted person    • Anemia    • Anesthesia     \"I cry\"   • Anxiety    • Arthritis     back lower and knees   • Bipolar 1 disorder (HCC)     bipolar 1   • Cancer (HCC)     ovarian   • Chronic pain disorder    • Colon polyp    • Depression    • Edema    • Emphysema of lung ([facility] [address] )    • Hepatitis C     pt reports no tx \"my body cured it\"   • History of bilateral knee replacement    • History of cancer chemotherapy    • History of pneumonia     \"couple times\"   • History of radiation therapy    • History of recent fall 01/04/2020   • History of transfusion     possibly with hysterectomy and with vaginal delivery   • Hyperlipidemia    • Hypertension     \"lately but not on meds\"   • Irritable bowel syndrome    • Migraine    • No natural teeth    • Panic attacks    • Poor vision    • PTSD (post-traumatic stress disorder)    • Risk for falls    • Seizures ([facility] [address] )     last one \"about 4 yrs ago\" never on meds,, \"possibly due to drug use\"   • Wears glasses    • Wrist fracture     right and in a cast   • Wrist pain, right     \"constant\"       Past Surgical History:   Procedure Laterality Date   • APPENDECTOMY     • COLONOSCOPY N/A 6/11/2018    Procedure: COLONOSCOPY;  Surgeon: Marialuisa Schilling MD;  Location: [facility]; Service: Gastroenterology   • DENTAL SURGERY     • DILATION AND CURETTAGE OF UTERUS      \"many\"   • HYSTERECTOMY     • JOINT REPLACEMENT Bilateral     knee   • LA OPTX DSTL RADL I-ARTIC FX/EPIPHYSL SEP 2 FRAG Right 1/17/2020    Procedure: ORIF DISTAL RADIUS FRACTURE;  Surgeon:  Esperanza Rodriguez MD;  Location: [facility];  Service: Orthopedics       Family History   Problem Relation " Age of Onset   • No Known Problems Mother    • No Known Problems Father      I have reviewed and agree with the history as documented  E-Cigarette/Vaping   • E-Cigarette Use Never User      E-Cigarette/Vaping Substances     Social History     Tobacco Use   • Smoking status: Every Day     Packs/day: 1 00     Years: 44 00     Pack years: 44 00     Types: Cigarettes   • Smokeless tobacco: Never   Vaping Use   • Vaping Use: Never used   Substance Use Topics   • Alcohol use: Yes   • Drug use: No     Comment: clean 1 5yrs        Review of Systems   Constitutional: Negative for appetite change, chills and fever  HENT: Negative for congestion, rhinorrhea and sore throat  Eyes: Negative for visual disturbance  Respiratory: Negative for cough and shortness of breath  Cardiovascular: Negative for chest pain  Gastrointestinal: Positive for diarrhea and nausea  Negative for abdominal pain, constipation and vomiting  Musculoskeletal: Negative for arthralgias and myalgias  Skin: Negative for rash  Neurological: Positive for headaches  Negative for dizziness, weakness, light-headedness and numbness  All other systems reviewed and are negative  Physical Exam  ED Triage Vitals [04/13/23 1236]   Temperature Pulse Respirations Blood Pressure SpO2   (!) 97 1 °F (36 2 °C) 81 18 110/72 99 %      Temp Source Heart Rate Source Patient Position - Orthostatic VS BP Location FiO2 (%)   Temporal Monitor Lying Right arm --      Pain Score       10 - Worst Possible Pain             Orthostatic Vital Signs  Vitals:    04/13/23 1236   BP: 110/72   Pulse: 81   Patient Position - Orthostatic VS: Lying       Physical Exam  Vitals and nursing note reviewed  Constitutional:       General: She is not in acute distress  Appearance: Normal appearance  She is well-developed and normal weight  She is not ill-appearing, toxic-appearing or diaphoretic  HENT:      Head: Normocephalic and atraumatic        Right Ear: External ear normal       Left Ear: External ear normal       Nose: Nose normal       Mouth/Throat:      Mouth: Mucous membranes are moist       Pharynx: Oropharynx is clear  Eyes:      Extraocular Movements: Extraocular movements intact  Conjunctiva/sclera: Conjunctivae normal    Cardiovascular:      Rate and Rhythm: Normal rate and regular rhythm  Pulses: Normal pulses  Heart sounds: Normal heart sounds  No murmur heard  No friction rub  No gallop  Pulmonary:      Effort: Pulmonary effort is normal  No respiratory distress  Breath sounds: Normal breath sounds  No wheezing or rales  Abdominal:      General: There is no distension  Palpations: Abdomen is soft  Tenderness: There is no abdominal tenderness  There is no guarding or rebound  Musculoskeletal:         General: No tenderness  Cervical back: Neck supple  Skin:     General: Skin is warm and dry  Coloration: Skin is not pale  Findings: No erythema or rash  Neurological:      General: No focal deficit present  Mental Status: She is alert and oriented to person, place, and time  Cranial Nerves: No cranial nerve deficit  Sensory: No sensory deficit  Motor: Weakness present  Comments: 3/5 strength in RLE with hip flexion, 4/5 with ankle dorsiflexion  4/5 strength with left leg hip flexion  5/5 strength in left lower extremity with ankle dorsiflexion and plantarflexion     Psychiatric:         Mood and Affect: Mood normal          Behavior: Behavior normal          ED Medications  Medications - No data to display    Diagnostic Studies  Results Reviewed     None                 CT head without contrast    (Results Pending)         Procedures  Procedures      ED Course                                       MDM      Disposition  Final diagnoses:   None     ED Disposition     None      Follow-up Information    None         Patient's Medications   Discharge Prescriptions    No medications on file     No discharge procedures on file  PDMP Review     None           ED Provider  Attending physically available and evaluated Haydee Sharma I managed the patient along with the ED Attending      Electronically Signed by a day as needed, Starting u 11/14/2019, Historical Med      lurasidone (LATUDA) 40 mg tablet Take 40 mg by mouth daily at bedtime , Historical Med      oxyCODONE (ROXICODONE) 5 mg immediate release tablet Take 1 tab q12H PRN pain, Normal       !! - Potential duplicate medications found  Please discuss with provider  No discharge procedures on file  PDMP Review     None           ED Provider  Attending physically available and evaluated Harley Le I managed the patient along with the ED Attending      Electronically Signed by         Susie Toth MD  04/21/23 2898

## 2024-02-03 ENCOUNTER — HOSPITAL ENCOUNTER (EMERGENCY)
Facility: HOSPITAL | Age: 64
End: 2024-02-03
Attending: EMERGENCY MEDICINE | Admitting: EMERGENCY MEDICINE
Payer: COMMERCIAL

## 2024-02-03 DIAGNOSIS — I46.9 CARDIAC ARREST (HCC): Primary | ICD-10-CM

## 2024-02-03 PROCEDURE — 99291 CRITICAL CARE FIRST HOUR: CPT

## 2024-02-03 PROCEDURE — 36556 INSERT NON-TUNNEL CV CATH: CPT | Performed by: EMERGENCY MEDICINE

## 2024-02-03 PROCEDURE — 99291 CRITICAL CARE FIRST HOUR: CPT | Performed by: EMERGENCY MEDICINE

## 2024-02-03 PROCEDURE — 92950 HEART/LUNG RESUSCITATION CPR: CPT | Performed by: EMERGENCY MEDICINE

## 2024-02-03 PROCEDURE — 92950 HEART/LUNG RESUSCITATION CPR: CPT

## 2024-02-03 RX ORDER — CALCIUM CHLORIDE 100 MG/ML
1 INJECTION INTRAVENOUS ONCE
Qty: 10 ML | Refills: 0 | Status: COMPLETED | OUTPATIENT
Start: 2024-02-03 | End: 2024-02-03

## 2024-02-03 RX ORDER — CALCIUM CHLORIDE 100 MG/ML
INJECTION INTRAVENOUS; INTRAVENTRICULAR
Status: COMPLETED
Start: 2024-02-03 | End: 2024-02-03

## 2024-02-03 RX ORDER — EPINEPHRINE 0.1 MG/ML
5 SYRINGE (ML) INJECTION ONCE
Status: COMPLETED | OUTPATIENT
Start: 2024-02-03 | End: 2024-02-03

## 2024-02-04 NOTE — ED PROVIDER NOTES
"History  Chief Complaint   Patient presents with    Cardiac Arrest     Family last heard from family around 1800. Patient failed to answer, upon check by family patient found down with cigarette and lighter in hand. Asystole upon EMS arrival      HPI    Татьяна Morales is a 63 y.o. female PMH bipolar, PTSD, arthritis, seizures, emphysema presenting by EMS after being called for prehospital cardiac arrest.  Per family, patient's roommate found her unresponsive at their home and called 911 around 7:10 PM.  When EMS arrived at the scene, they found the patient had been coughing or vomiting blood and was unresponsive.  Began resuscitation with intubation.  They state that there was aaron blood coming from the airway patient did have some pill bottles nearby that were mostly empty.  Downtime prior to EMS arrival is unknown.  EMS resuscitated patient for 1 hour prior to arriving to the ED.  Patient was asystolic this entire time.  She did receive epi and calcium by EMS.  On arrival to the ED, patient's 2 placement was confirmed.  A pulse check yielded no pulses and patient was found to be asystolic both on EKG and on ultrasound.  Time of death ultimately called at 20:11.     Prior to Admission Medications   Prescriptions Last Dose Informant Patient Reported? Taking?   buprenorphine (SUBUTEX) 8 mg   Yes No   Sig: Place under the tongue 2 (two) times a day 1 tab AM and 1/2 tab afternoon   dicyclomine (BENTYL) 20 mg tablet   No No   Sig: Take 1 tablet (20 mg total) by mouth every 6 (six) hours for 30 days   ibuprofen (MOTRIN) 800 mg tablet   Yes No   Sig: Take 800 mg by mouth 3 (three) times a day as needed   lurasidone (LATUDA) 40 mg tablet   Yes No   Sig: Take 40 mg by mouth daily at bedtime       Facility-Administered Medications: None       Past Medical History:   Diagnosis Date    Adopted person     Anemia     Anesthesia     \"I cry\"    Anxiety     Arthritis     back lower and knees    Bipolar 1 disorder (HCC)     bipolar 1 " "   Cancer (HCC)     ovarian    Chronic pain disorder     Colon polyp     Depression     Edema     Emphysema of lung (HCC)     Hepatitis C     pt reports no tx \"my body cured it\"    History of bilateral knee replacement     History of cancer chemotherapy     History of pneumonia     \"couple times\"    History of radiation therapy     History of recent fall 01/04/2020    History of transfusion     possibly with hysterectomy and with vaginal delivery    Hyperlipidemia     Hypertension     \"lately but not on meds\"    Irritable bowel syndrome     Migraine     No natural teeth     Panic attacks     Poor vision     PTSD (post-traumatic stress disorder)     Risk for falls     Seizures (HCC)     last one \"about 4 yrs ago\" never on meds,, \"possibly due to drug use\"    Wears glasses     Wrist fracture     right and in a cast    Wrist pain, right     \"constant\"       Past Surgical History:   Procedure Laterality Date    APPENDECTOMY      COLONOSCOPY N/A 6/11/2018    Procedure: COLONOSCOPY;  Surgeon: Keith Jason MD;  Location:  [facility];  Service: Gastroenterology    DENTAL SURGERY      DILATION AND CURETTAGE OF UTERUS      \"many\"    HYSTERECTOMY      JOINT REPLACEMENT Bilateral     knee    LA OPTX DSTL RADL I-ARTIC FX/EPIPHYSL SEP 2 FRAG Right 1/17/2020    Procedure: ORIF DISTAL RADIUS FRACTURE;  Surgeon: Pierre Gray MD;  Location: [facility];  Service: Orthopedics       Family History   Problem Relation Age of Onset    No Known Problems Mother     No Known Problems Father      I have reviewed and agree with the history as documented.    E-Cigarette/Vaping    E-Cigarette Use Never User      E-Cigarette/Vaping Substances     Social History     Tobacco Use    Smoking status: Every Day     Current packs/day: 1.00     Average packs/day: 1 pack/day for 44.0 years (44.0 ttl pk-yrs)     Types: Cigarettes    Smokeless tobacco: Never   Vaping Use    Vaping status: Never Used   Substance Use Topics    Alcohol use: Yes    Drug " use: No     Comment: clean 1.5yrs        Review of Systems   Unable to perform ROS: Intubated       Physical Exam  ED Triage Vitals [02/03/24 2007]   Temp Pulse Resp BP SpO2   -- (!) 0 -- -- --      Temp src Heart Rate Source Patient Position - Orthostatic VS BP Location FiO2 (%)   -- -- -- -- --      Pain Score       --             Orthostatic Vital Signs  Vitals:    02/03/24 2007   Pulse: (!) 0       Physical Exam  Constitutional:       Appearance: She is not toxic-appearing.      Comments: Unresponsive, intubated, asystolic   HENT:      Head: Normocephalic and atraumatic.      Nose: Nose normal.   Eyes:      Comments: Pupils fixed   Cardiovascular:      Comments: Asystolic  Pulmonary:      Comments: Intubated with no spontaneous breaths  Abdominal:      General: Abdomen is flat.   Skin:     Capillary Refill: Capillary refill takes more than 3 seconds.      Coloration: Skin is pale.   Neurological:      Comments: Unresponsive         ED Medications  Medications   EPINEPHrine (FOR EMS ONLY) (ADRENALIN) injection 5 mg (0 mg Does not apply Given to EMS 2/3/24 2014)   calcium chloride (FOR EMS ONLY) 10 % injection 1 g (0 g Does not apply Given to EMS 2/3/24 2015)   sodium bicarbonate (FOR EMS ONLY) 8.4 % injection 50 mEq (0 mEq Does not apply Given to EMS 2/3/24 2014)       Diagnostic Studies  Results Reviewed       None                   No orders to display         Procedures  Procedures      ED Course                                       Medical Decision Making  Risk  Prescription drug management.        Patient is a 63 y.o. female  who presents to the ED with cardiac arrest.    Vital signs -heart rate and blood pressure undetectable. Exam as listed above    Differential diagnosis includes but is not limited to -prehospital cardiac arrest of unknown origin    Plan for resuscitation; resuscitation ultimately ended due to futility.  Patient was asystolic for greater than 1 hour    View ED course above for further  discussion on patient workup.     All labs reviewed and utilized in the medical decision making process  All radiology studies independently viewed by me and interpreted by the radiologist.  I reviewed all testing with the patient.     Disposition  Final diagnoses:   Cardiac arrest (HCC)     Time reflects when diagnosis was documented in both MDM as applicable and the Disposition within this note       Time User Action Codes Description Comment    2/3/2024  8:13 PM Juan Del Real Add [I46.9] Cardiac arrest (HCC)           ED Disposition       ED Disposition       Condition   --    Date/Time   Sat Feb 3, 2024  8:13 PM    Comment   --             Follow-up Information    None         Discharge Medication List as of 2/3/2024 10:55 PM        CONTINUE these medications which have NOT CHANGED    Details   buprenorphine (SUBUTEX) 8 mg Place under the tongue 2 (two) times a day 1 tab AM and 1/2 tab afternoon, Historical Med      dicyclomine (BENTYL) 20 mg tablet Take 1 tablet (20 mg total) by mouth every 6 (six) hours for 30 days, Starting Mon 2018, Until Wed 1/15/2020, Print      !! ibuprofen (MOTRIN) 800 mg tablet Take 800 mg by mouth 3 (three) times a day as needed, Starting Thu 2019, Historical Med      lurasidone (LATUDA) 40 mg tablet Take 40 mg by mouth daily at bedtime , Historical Med      acetaminophen (TYLENOL) 500 mg tablet Take 1 tablet (500 mg total) by mouth every 6 (six) hours as needed for mild pain, Starting Sun 3/8/2020, Print      !! ibuprofen (MOTRIN) 400 mg tablet Take 1 tablet (400 mg total) by mouth every 6 (six) hours as needed for moderate pain, Starting Sun 3/8/2020, Print      oxyCODONE (ROXICODONE) 5 mg immediate release tablet Take 1 tab q12H PRN pain, Normal       !! - Potential duplicate medications found. Please discuss with provider.        No discharge procedures on file.    PDMP Review       None             ED Provider  Attending physically available and evaluated Татьяна  Carmen. I managed the patient along with the ED Attending.    Electronically Signed by           Denise Wren MD  02/06/24 2037

## 2024-02-04 NOTE — ED PROCEDURE NOTE
Procedure  Central Line    Date/Time: 2/3/2024 8:18 PM    Performed by: Carlos Sorto MD  Authorized by: Carlos Sorto MD    Patient location:  ED  Other Assisting Provider: No    Consent:     Consent obtained:  Emergent situation  Indications:     Central line indications: no peripheral vascular access    Anesthesia (see MAR for exact dosages):     Anesthesia method:  None  Procedure details:     Location:  Left femoral    Vessel type: vein      Laterality:  Left    Approach: percutaneous technique used      Patient position:  Flat    Catheter type:  Triple lumen    Catheter size:  9 Fr    Landmarks identified: yes      Ultrasound guidance: no      Manometry confirmation: no      Number of attempts:  1    Successful placement: yes    Post-procedure details:     Post-procedure:  Dressing applied    Assessment:  Blood return through all ports    Post-procedure complications: none      Patient tolerance of procedure:  Tolerated well, no immediate complications                   Carlos Sorto MD  02/03/24 2020

## 2024-02-04 NOTE — ED ATTENDING ATTESTATION
2/3/2024  I, Juan Del Real DO, saw and evaluated the patient. I have discussed the patient with the resident/non-physician practitioner and agree with the resident's/non-physician practitioner's findings, Plan of Care, and MDM as documented in the resident's/non-physician practitioner's note, except where noted. All available labs and Radiology studies were reviewed.  I was present for key portions of any procedure(s) performed by the resident/non-physician practitioner and I was immediately available to provide assistance.       At this point I agree with the current assessment done in the Emergency Department.  I have conducted an independent evaluation of this patient a history and physical is as follows:    Patient is a 63-year-old female, history of tobacco use, hepatic cyst, depression, bipolar, PTSD, brought in by EMS in cardiac arrest.  Per discussion with the daughter and EMS, family talked with the patient this afternoon, then about 7 PM the patient's roommate called 911 because the patient was unresponsive, also called the patient's daughter to notify her about the patient being unresponsive.  EMS indicates that they were dispatched at 7:04 PM, arrived on scene at 7:09 PM, found the patient sitting at a desk, pulseless, apneic, completely unresponsive, there was some blood in the airway, they initiated ACLS resuscitation, patient was asystolic, perform CPR, they intubated, placed 2 femoral IO lines and a tibial IO line,, the left humeral and left tibial IO line became dislodged resulting in the placement of the right humeral IO line.  Continued CPR administered a total of 5 doses of epinephrine, 1 amp of bicarbonate and 1 amp of calcium.  There was no obvious signs of trauma.  The patient throughout the resuscitation did not have any return of spontaneous circulation, remained asystolic the entire time.    Pulse: 0, respirations: 0, blood pressure 0    General:  Patient is unresponsive in cardiac  arrest  Head:  Atraumatic  Eyes: Pupils 4 mm, nonreactive  ENT:  Mucous membranes are moist, 7-0 endotracheal tube in place  Neck:  Supple  Cardiac: Absent cardiac activity  Lungs: Absent spontaneous respirations, bilateral positive breath sounds and no epigastric sounds with bag-valve-mask ventilation  Abdomen: Soft  Extremities: Flaccid, left humeral, right humeral and left tibial intraosseous line in place  Neurologic: Completely unresponsive  Skin: Pale, warm    ED Course       Patient arrived, CPR continued, patient remained asystolic, point-of-care cardiac ultrasound showed no cardiac activity, no pericardial effusion, patient was asystolic in multiple leads.  Due to the prolonged downtime, presenting rhythm of asystole, lack of perfusing rhythm, lack of return of spontaneous circulation, I made the decision to terminate further resuscitative efforts and the patient was pronounced dead at 8:10 PM.  I spoke with the patient's family to obtain additional information and inform of the news.  The  was contacted, who indicated the patient was a 's case.  The patient's primary care physician answering service was contacted, the edition calling back was not familiar with the patient, indicated she did not feel comfortable serving as the certifying physician on the death certificate.  I will serve as the pronouncing physician on the death certificate.          Critical Care Time  CriticalCare Time    Date/Time: 2/3/2024 8:42 PM    Performed by: Juan Del Real DO  Authorized by: Juan Del Real DO    Critical care provider statement:     Critical care time (minutes):  32    Critical care time was exclusive of:  Separately billable procedures and treating other patients and teaching time    Critical care was necessary to treat or prevent imminent or life-threatening deterioration of the following conditions:  Cardiac failure    Critical care was time spent personally by me on the following activities:   Obtaining history from patient or surrogate, evaluation of patient's response to treatment, examination of patient, review of old charts, ventilator management and re-evaluation of patient's condition    I assumed direction of critical care for this patient from another provider in my specialty: no

## (undated) DEVICE — PADDING CAST 4 IN  COTTON STRL

## (undated) DEVICE — TUBING SUCTION 5MM X 12 FT

## (undated) DEVICE — ACE WRAP 4 IN UNSTERILE

## (undated) DEVICE — BANDAGE, ESMARK LF STR 4"X9'(20/CS): Brand: CYPRESS

## (undated) DEVICE — 3M™ STERI-DRAPE™ U-DRAPE 1015: Brand: STERI-DRAPE™

## (undated) DEVICE — DRAPE SHEET THREE QUARTER

## (undated) DEVICE — CURITY NON-ADHERENT STRIPS: Brand: CURITY

## (undated) DEVICE — CHLORAPREP HI-LITE 26ML ORANGE

## (undated) DEVICE — PAD CAST 4 IN COTTON NON STERILE

## (undated) DEVICE — BOWL: 16OZ PEELPOUCH 75/CS 16/PLT: Brand: MEDEGEN MEDICAL PRODUCTS, LLC

## (undated) DEVICE — 10FR FRAZIER SUCTION HANDLE: Brand: CARDINAL HEALTH

## (undated) DEVICE — INTENDED FOR TISSUE SEPARATION, AND OTHER PROCEDURES THAT REQUIRE A SHARP SURGICAL BLADE TO PUNCTURE OR CUT.: Brand: BARD-PARKER ® CARBON RIB-BACK BLADES

## (undated) DEVICE — TIBURON HAND DRAPE: Brand: CONVERTORS

## (undated) DEVICE — SUT ETHILON 4-0 PS-2 18 IN 1667H

## (undated) DEVICE — DRAPE C-ARM X-RAY

## (undated) DEVICE — GLOVE INDICATOR PI UNDERGLOVE SZ 8 BLUE

## (undated) DEVICE — SCD SEQUENTIAL COMPRESSION COMFORT SLEEVE MEDIUM KNEE LENGTH: Brand: KENDALL SCD

## (undated) DEVICE — SUT MONOCRYL 3-0 PS-2 27 IN Y427H

## (undated) DEVICE — TOWEL SET X-RAY

## (undated) DEVICE — GAUZE SPONGES,USP TYPE VII GAUZE, 12 PLY: Brand: CURITY

## (undated) DEVICE — CUFF TOURNIQUET 18 X 4 IN QUICK CONNECT DISP 1 BLADDER

## (undated) DEVICE — K-WIRE TROCAR POINT BOTH ENDS .045                                    X 4
Type: IMPLANTABLE DEVICE | Site: WRIST | Status: NON-FUNCTIONAL
Removed: 2020-01-17

## (undated) DEVICE — TWIST DRILL Ø2.0MM X 40MM, L91MM, AO: Brand: APTUS